# Patient Record
Sex: FEMALE | Race: ASIAN | NOT HISPANIC OR LATINO | Employment: UNEMPLOYED | URBAN - METROPOLITAN AREA
[De-identification: names, ages, dates, MRNs, and addresses within clinical notes are randomized per-mention and may not be internally consistent; named-entity substitution may affect disease eponyms.]

---

## 2021-07-23 ENCOUNTER — OFFICE VISIT (OUTPATIENT)
Dept: FAMILY MEDICINE CLINIC | Facility: CLINIC | Age: 31
End: 2021-07-23

## 2021-07-23 VITALS
DIASTOLIC BLOOD PRESSURE: 76 MMHG | HEART RATE: 83 BPM | HEIGHT: 66 IN | WEIGHT: 160 LBS | OXYGEN SATURATION: 99 % | SYSTOLIC BLOOD PRESSURE: 128 MMHG | BODY MASS INDEX: 25.71 KG/M2 | TEMPERATURE: 97.3 F

## 2021-07-23 DIAGNOSIS — Z13.6 SCREENING FOR CARDIOVASCULAR CONDITION: Primary | ICD-10-CM

## 2021-07-23 DIAGNOSIS — Z00.00 PHYSICAL EXAM: ICD-10-CM

## 2021-07-23 DIAGNOSIS — E55.9 VITAMIN D DEFICIENCY: ICD-10-CM

## 2021-07-23 DIAGNOSIS — Z11.59 NEED FOR HEPATITIS C SCREENING TEST: ICD-10-CM

## 2021-07-23 DIAGNOSIS — R53.83 OTHER FATIGUE: ICD-10-CM

## 2021-07-23 PROCEDURE — 99385 PREV VISIT NEW AGE 18-39: CPT | Performed by: FAMILY MEDICINE

## 2021-07-23 RX ORDER — PRENATAL WITH FERROUS FUM AND FOLIC ACID 3080; 920; 120; 400; 22; 1.84; 3; 20; 10; 1; 12; 200; 27; 25; 2 [IU]/1; [IU]/1; MG/1; [IU]/1; MG/1; MG/1; MG/1; MG/1; MG/1; MG/1; UG/1; MG/1; MG/1; MG/1; MG/1
1 TABLET ORAL DAILY
Qty: 90 TABLET | Refills: 3 | Status: SHIPPED | OUTPATIENT
Start: 2021-07-23 | End: 2022-07-26 | Stop reason: SDUPTHER

## 2021-07-23 NOTE — PROGRESS NOTES
"Chief Complaint  Annual Exam (NP)    Subjective          Es Singer presents to [facility]  History of Present Illness  New pt here for CPE and to get established.  She last had a CPE in [place] 3 years ago.  He has no complaints.  She is fasting.  She does occasional walking.  That is her exercise.  Objective   Vital Signs:   /76   Pulse 83   Temp 97.3 °F (36.3 °C) (Infrared)   Ht 167.6 cm (66\")   Wt 72.6 kg (160 lb)   SpO2 99%   BMI 25.82 kg/m²     Physical Exam  Vitals and nursing note reviewed.   Constitutional:       General: She is not in acute distress.     Appearance: Normal appearance. She is well-developed and normal weight. She is not ill-appearing, toxic-appearing or diaphoretic.   HENT:      Head: Normocephalic and atraumatic.      Right Ear: Tympanic membrane, ear canal and external ear normal. There is no impacted cerumen.      Left Ear: Tympanic membrane, ear canal and external ear normal. There is no impacted cerumen.      Nose: Nose normal. No congestion or rhinorrhea.      Mouth/Throat:      Mouth: Mucous membranes are moist.      Pharynx: Oropharynx is clear. No oropharyngeal exudate or posterior oropharyngeal erythema.   Eyes:      General: No scleral icterus.        Right eye: No discharge.         Left eye: No discharge.      Extraocular Movements: Extraocular movements intact.      Conjunctiva/sclera: Conjunctivae normal.      Pupils: Pupils are equal, round, and reactive to light.   Neck:      Thyroid: No thyroid mass or thyromegaly.      Vascular: No carotid bruit or JVD.      Trachea: Trachea normal. No tracheal tenderness or tracheal deviation.   Cardiovascular:      Rate and Rhythm: Normal rate and regular rhythm.      Heart sounds: Normal heart sounds. No murmur heard.   No gallop.    Pulmonary:      Effort: Pulmonary effort is normal. No respiratory distress.      Breath sounds: Normal breath sounds. No stridor. No wheezing, rhonchi or " rales.   Chest:      Chest wall: No tenderness.   Abdominal:      General: Bowel sounds are normal. There is no distension.      Palpations: Abdomen is soft. There is no mass.      Tenderness: There is no abdominal tenderness. There is no right CVA tenderness, left CVA tenderness, guarding or rebound.      Hernia: No hernia is present.   Musculoskeletal:         General: No swelling or tenderness. Normal range of motion.      Cervical back: Normal range of motion and neck supple. No tenderness.      Right lower leg: No edema.      Left lower leg: No edema.   Lymphadenopathy:      Cervical: No cervical adenopathy.   Skin:     General: Skin is warm and dry.      Coloration: Skin is not jaundiced.   Neurological:      General: No focal deficit present.      Mental Status: She is alert and oriented to person, place, and time.      Sensory: No sensory deficit.      Motor: No weakness or abnormal muscle tone.      Coordination: Coordination normal.      Gait: Gait normal.      Deep Tendon Reflexes: Reflexes normal.   Psychiatric:         Mood and Affect: Mood normal.         Behavior: Behavior normal.         Thought Content: Thought content normal.         Judgment: Judgment normal.        Result Review :{Labs  Result Review  Imaging  Med Tab  Media  Procedures :23}                 Assessment and Plan    Diagnoses and all orders for this visit:    1. Screening for cardiovascular condition (Primary)  -     Comprehensive Metabolic Panel  -     Lipid Panel    2. Need for hepatitis C screening test  -     Hepatitis C Antibody    3. Vitamin D deficiency  -     Vitamin D 25 Hydroxy    4. Physical exam    5. Other fatigue  -     CBC & Differential  -     TSH Rfx On Abnormal To Free T4  -     Vitamin B12        Follow Up   No follow-ups on file.  Patient was given instructions and counseling regarding her condition or for health maintenance advice. Please see specific information pulled into the AVS if appropriate.

## 2021-07-24 DIAGNOSIS — E55.9 VITAMIN D DEFICIENCY: Primary | ICD-10-CM

## 2021-07-24 PROBLEM — E53.8 B12 DEFICIENCY: Status: ACTIVE | Noted: 2021-07-24

## 2021-07-24 LAB
25(OH)D3+25(OH)D2 SERPL-MCNC: 8.3 NG/ML (ref 30–100)
ALBUMIN SERPL-MCNC: 4.6 G/DL (ref 3.5–5.2)
ALBUMIN/GLOB SERPL: 1.6 G/DL
ALP SERPL-CCNC: 74 U/L (ref 39–117)
ALT SERPL-CCNC: 19 U/L (ref 1–33)
AST SERPL-CCNC: 16 U/L (ref 1–32)
BASOPHILS # BLD AUTO: 0.03 10*3/MM3 (ref 0–0.2)
BASOPHILS NFR BLD AUTO: 0.5 % (ref 0–1.5)
BILIRUB SERPL-MCNC: 1 MG/DL (ref 0–1.2)
BUN SERPL-MCNC: 5 MG/DL (ref 6–20)
BUN/CREAT SERPL: 8.6 (ref 7–25)
CALCIUM SERPL-MCNC: 9.7 MG/DL (ref 8.6–10.5)
CHLORIDE SERPL-SCNC: 105 MMOL/L (ref 98–107)
CHOLEST SERPL-MCNC: 175 MG/DL (ref 0–200)
CO2 SERPL-SCNC: 26.3 MMOL/L (ref 22–29)
CREAT SERPL-MCNC: 0.58 MG/DL (ref 0.57–1)
EOSINOPHIL # BLD AUTO: 0.12 10*3/MM3 (ref 0–0.4)
EOSINOPHIL NFR BLD AUTO: 2 % (ref 0.3–6.2)
ERYTHROCYTE [DISTWIDTH] IN BLOOD BY AUTOMATED COUNT: 11.6 % (ref 12.3–15.4)
GLOBULIN SER CALC-MCNC: 2.9 GM/DL
GLUCOSE SERPL-MCNC: 84 MG/DL (ref 65–99)
HCT VFR BLD AUTO: 43.1 % (ref 34–46.6)
HCV AB S/CO SERPL IA: <0.1 S/CO RATIO (ref 0–0.9)
HDLC SERPL-MCNC: 42 MG/DL (ref 40–60)
HGB BLD-MCNC: 14.4 G/DL (ref 12–15.9)
IMM GRANULOCYTES # BLD AUTO: 0.02 10*3/MM3 (ref 0–0.05)
IMM GRANULOCYTES NFR BLD AUTO: 0.3 % (ref 0–0.5)
LDLC SERPL CALC-MCNC: 106 MG/DL (ref 0–100)
LYMPHOCYTES # BLD AUTO: 1.4 10*3/MM3 (ref 0.7–3.1)
LYMPHOCYTES NFR BLD AUTO: 23.3 % (ref 19.6–45.3)
MCH RBC QN AUTO: 30.4 PG (ref 26.6–33)
MCHC RBC AUTO-ENTMCNC: 33.4 G/DL (ref 31.5–35.7)
MCV RBC AUTO: 91.1 FL (ref 79–97)
MONOCYTES # BLD AUTO: 0.39 10*3/MM3 (ref 0.1–0.9)
MONOCYTES NFR BLD AUTO: 6.5 % (ref 5–12)
NEUTROPHILS # BLD AUTO: 4.05 10*3/MM3 (ref 1.7–7)
NEUTROPHILS NFR BLD AUTO: 67.4 % (ref 42.7–76)
NRBC BLD AUTO-RTO: 0 /100 WBC (ref 0–0.2)
PLATELET # BLD AUTO: 288 10*3/MM3 (ref 140–450)
POTASSIUM SERPL-SCNC: 4 MMOL/L (ref 3.5–5.2)
PROT SERPL-MCNC: 7.5 G/DL (ref 6–8.5)
RBC # BLD AUTO: 4.73 10*6/MM3 (ref 3.77–5.28)
SODIUM SERPL-SCNC: 137 MMOL/L (ref 136–145)
TRIGL SERPL-MCNC: 150 MG/DL (ref 0–150)
TSH SERPL DL<=0.005 MIU/L-ACNC: 1.95 UIU/ML (ref 0.27–4.2)
VIT B12 SERPL-MCNC: 173 PG/ML (ref 211–946)
VLDLC SERPL CALC-MCNC: 27 MG/DL (ref 5–40)
WBC # BLD AUTO: 6.01 10*3/MM3 (ref 3.4–10.8)

## 2021-07-24 RX ORDER — ERGOCALCIFEROL 1.25 MG/1
50000 CAPSULE ORAL WEEKLY
Qty: 5 CAPSULE | Refills: 5 | Status: SHIPPED | OUTPATIENT
Start: 2021-07-24 | End: 2022-07-22 | Stop reason: SDUPTHER

## 2021-07-26 ENCOUNTER — TELEPHONE (OUTPATIENT)
Dept: FAMILY MEDICINE CLINIC | Facility: CLINIC | Age: 31
End: 2021-07-26

## 2021-11-24 NOTE — TELEPHONE ENCOUNTER
----- Message from Lesa Guzman MD sent at 7/24/2021  2:44 PM EDT -----  Your B12 levels are low.  I want you to start taking B12 shots.  You will take a shot once a week for a month and then once a month after that.  Also, your Vitamin D is very low.  I am starting you on weekly Vitamin D pills for this.  I have sent it to your pharmacy.  All else looks good.  
Patient informed and verbalized understanding.  Will call back for appointment for injections.   
lmtcb     Okay for hub to read    Your B12 levels are low.  I want you to start taking B12 shots.  You will take a shot once a week for a month and then once a month after that.   Also, your Vitamin D is very low.  I am starting you on weekly Vitamin D pills for this.  I have sent it to your pharmacy.  All else looks good.     
PAST SURGICAL HISTORY:  No significant past surgical history

## 2022-05-11 ENCOUNTER — TELEPHONE (OUTPATIENT)
Dept: FAMILY MEDICINE CLINIC | Facility: CLINIC | Age: 32
End: 2022-05-11

## 2022-05-11 NOTE — TELEPHONE ENCOUNTER
Some labs that were recently submitted were billed wrong, so a form was sent back to the office to correct the issue. When Jet received the form back everything was wrong. Aura was calling just to get the forms fixed and make sure everything was okay for the patient.[phone]

## 2022-07-22 DIAGNOSIS — E55.9 VITAMIN D DEFICIENCY: ICD-10-CM

## 2022-07-25 RX ORDER — ERGOCALCIFEROL 1.25 MG/1
50000 CAPSULE ORAL WEEKLY
Qty: 5 CAPSULE | Refills: 5 | Status: SHIPPED | OUTPATIENT
Start: 2022-07-25 | End: 2023-04-04 | Stop reason: HOSPADM

## 2022-07-25 RX ORDER — ERGOCALCIFEROL 1.25 MG/1
CAPSULE ORAL
Qty: 5 CAPSULE | Refills: 0 | OUTPATIENT
Start: 2022-07-25

## 2022-07-25 NOTE — TELEPHONE ENCOUNTER
Rx Refill Note  Requested Prescriptions     Pending Prescriptions Disp Refills   • vitamin D (ERGOCALCIFEROL) 1.25 MG (82795 UT) capsule capsule [Pharmacy Med Name: Vitamin D (Ergocalciferol) Oral Capsule 1.25 MG (71054 UT)] 5 capsule 0     Sig: TAKE ONE CAPSULE BY MOUTH WEEKLY      Last office visit with prescribing clinician: 7/23/2021      Next office visit with prescribing clinician: 8/4/2022            Mauricio Hutchinson, RAMIREZ/SHANE  07/25/22, 07:13 EDT

## 2022-07-25 NOTE — TELEPHONE ENCOUNTER
Rx Refill Note  Requested Prescriptions     Pending Prescriptions Disp Refills   • vitamin D (ERGOCALCIFEROL) 1.25 MG (80289 UT) capsule capsule 5 capsule 5     Sig: Take 1 capsule by mouth 1 (One) Time Per Week.      Last office visit with prescribing clinician: 7/23/2021      Next office visit with prescribing clinician: 8/4/2022            Mauricio Hutchinson, RAMIREZ/SHANE  07/25/22, 07:11 EDT

## 2022-07-26 DIAGNOSIS — Z00.00 PHYSICAL EXAM: ICD-10-CM

## 2022-07-27 RX ORDER — PRENATAL WITH FERROUS FUM AND FOLIC ACID 3080; 920; 120; 400; 22; 1.84; 3; 20; 10; 1; 12; 200; 27; 25; 2 [IU]/1; [IU]/1; MG/1; [IU]/1; MG/1; MG/1; MG/1; MG/1; MG/1; MG/1; UG/1; MG/1; MG/1; MG/1; MG/1
1 TABLET ORAL DAILY
Qty: 90 TABLET | Refills: 3 | Status: SHIPPED | OUTPATIENT
Start: 2022-07-27 | End: 2022-10-11 | Stop reason: SDUPTHER

## 2022-07-27 NOTE — TELEPHONE ENCOUNTER
Rx Refill Note  Requested Prescriptions     Pending Prescriptions Disp Refills   • Prenatal Vit-Fe Fumarate-FA (Prenatal 27-1) 27-1 MG tablet tablet 90 tablet 3     Sig: Take 1 tablet by mouth Daily.      Last office visit with prescribing clinician: 7/23/2021      Next office visit with prescribing clinician: 8/4/2022            Mauricio Hutchinson CMA/SHANE  07/27/22, 07:28 EDT

## 2022-07-29 DIAGNOSIS — E53.8 B12 DEFICIENCY: ICD-10-CM

## 2022-07-29 DIAGNOSIS — R53.83 OTHER FATIGUE: ICD-10-CM

## 2022-07-29 DIAGNOSIS — Z13.220 ENCOUNTER FOR LIPID SCREENING FOR CARDIOVASCULAR DISEASE: ICD-10-CM

## 2022-07-29 DIAGNOSIS — Z13.6 ENCOUNTER FOR LIPID SCREENING FOR CARDIOVASCULAR DISEASE: ICD-10-CM

## 2022-07-29 DIAGNOSIS — E55.9 VITAMIN D DEFICIENCY: ICD-10-CM

## 2022-07-29 DIAGNOSIS — Z32.01 POSITIVE PREGNANCY TEST: Primary | ICD-10-CM

## 2022-08-02 LAB
25(OH)D3+25(OH)D2 SERPL-MCNC: 31.4 NG/ML (ref 30–100)
ALBUMIN SERPL-MCNC: 4.4 G/DL (ref 3.8–4.8)
ALBUMIN/GLOB SERPL: 1.8 {RATIO} (ref 1.2–2.2)
ALP SERPL-CCNC: 53 IU/L (ref 44–121)
ALT SERPL-CCNC: 19 IU/L (ref 0–32)
AST SERPL-CCNC: 15 IU/L (ref 0–40)
B-HCG SERPL QL: POSITIVE MIU/ML
BASOPHILS # BLD AUTO: 0 X10E3/UL (ref 0–0.2)
BASOPHILS NFR BLD AUTO: 1 %
BILIRUB SERPL-MCNC: 0.8 MG/DL (ref 0–1.2)
BUN SERPL-MCNC: 7 MG/DL (ref 6–20)
BUN/CREAT SERPL: 12 (ref 9–23)
CALCIUM SERPL-MCNC: 9.5 MG/DL (ref 8.7–10.2)
CHLORIDE SERPL-SCNC: 100 MMOL/L (ref 96–106)
CHOLEST SERPL-MCNC: 124 MG/DL (ref 100–199)
CO2 SERPL-SCNC: 20 MMOL/L (ref 20–29)
CREAT SERPL-MCNC: 0.58 MG/DL (ref 0.57–1)
EGFRCR SERPLBLD CKD-EPI 2021: 122 ML/MIN/1.73
EOSINOPHIL # BLD AUTO: 0.1 X10E3/UL (ref 0–0.4)
EOSINOPHIL NFR BLD AUTO: 2 %
ERYTHROCYTE [DISTWIDTH] IN BLOOD BY AUTOMATED COUNT: 11.9 % (ref 11.7–15.4)
GLOBULIN SER CALC-MCNC: 2.5 G/DL (ref 1.5–4.5)
GLUCOSE SERPL-MCNC: 83 MG/DL (ref 65–99)
HCT VFR BLD AUTO: 39.1 % (ref 34–46.6)
HDLC SERPL-MCNC: 45 MG/DL
HGB BLD-MCNC: 13 G/DL (ref 11.1–15.9)
IMM GRANULOCYTES # BLD AUTO: 0 X10E3/UL (ref 0–0.1)
IMM GRANULOCYTES NFR BLD AUTO: 0 %
LDLC SERPL CALC-MCNC: 61 MG/DL (ref 0–99)
LYMPHOCYTES # BLD AUTO: 1.8 X10E3/UL (ref 0.7–3.1)
LYMPHOCYTES NFR BLD AUTO: 24 %
MCH RBC QN AUTO: 29.5 PG (ref 26.6–33)
MCHC RBC AUTO-ENTMCNC: 33.2 G/DL (ref 31.5–35.7)
MCV RBC AUTO: 89 FL (ref 79–97)
MONOCYTES # BLD AUTO: 0.4 X10E3/UL (ref 0.1–0.9)
MONOCYTES NFR BLD AUTO: 6 %
NEUTROPHILS # BLD AUTO: 4.9 X10E3/UL (ref 1.4–7)
NEUTROPHILS NFR BLD AUTO: 67 %
PLATELET # BLD AUTO: 230 X10E3/UL (ref 150–450)
POTASSIUM SERPL-SCNC: 3.8 MMOL/L (ref 3.5–5.2)
PROT SERPL-MCNC: 6.9 G/DL (ref 6–8.5)
RBC # BLD AUTO: 4.41 X10E6/UL (ref 3.77–5.28)
SODIUM SERPL-SCNC: 135 MMOL/L (ref 134–144)
TRIGL SERPL-MCNC: 92 MG/DL (ref 0–149)
TSH SERPL DL<=0.005 MIU/L-ACNC: 3.17 UIU/ML (ref 0.45–4.5)
VIT B12 SERPL-MCNC: 593 PG/ML (ref 232–1245)
VLDLC SERPL CALC-MCNC: 18 MG/DL (ref 5–40)
WBC # BLD AUTO: 7.3 X10E3/UL (ref 3.4–10.8)

## 2022-08-04 ENCOUNTER — OFFICE VISIT (OUTPATIENT)
Dept: FAMILY MEDICINE CLINIC | Facility: CLINIC | Age: 32
End: 2022-08-04

## 2022-08-04 VITALS
RESPIRATION RATE: 16 BRPM | OXYGEN SATURATION: 99 % | HEART RATE: 85 BPM | DIASTOLIC BLOOD PRESSURE: 86 MMHG | HEIGHT: 66 IN | TEMPERATURE: 98 F | BODY MASS INDEX: 23.5 KG/M2 | SYSTOLIC BLOOD PRESSURE: 112 MMHG | WEIGHT: 146.2 LBS

## 2022-08-04 DIAGNOSIS — Z00.00 PHYSICAL EXAM: Primary | ICD-10-CM

## 2022-08-04 DIAGNOSIS — Z34.90 PREGNANCY, UNSPECIFIED GESTATIONAL AGE: ICD-10-CM

## 2022-08-04 PROCEDURE — 99395 PREV VISIT EST AGE 18-39: CPT | Performed by: FAMILY MEDICINE

## 2022-08-04 RX ORDER — LANOLIN ALCOHOL/MO/W.PET/CERES
1000 CREAM (GRAM) TOPICAL DAILY
COMMUNITY
End: 2023-04-04 | Stop reason: HOSPADM

## 2022-08-04 NOTE — PROGRESS NOTES
"Chief Complaint  Annual Exam    Subjective        Es Singer presents to [facility]  History of Present Illness  PT is here for a CPE.  She recently found out she is pregnant and needs a gyn doctor.  Labs done 2 days ago and all was in normal range.    Some walking, no tobacco, no etoh, no drug use.  lmp was June 24th  Objective   Vital Signs:  /86 (BP Location: Left arm, Patient Position: Sitting, Cuff Size: Adult)   Pulse 85   Temp 98 °F (36.7 °C) (Temporal)   Resp 16   Ht 167.6 cm (66\")   Wt 66.3 kg (146 lb 3.2 oz)   SpO2 99%   BMI 23.60 kg/m²   Estimated body mass index is 23.6 kg/m² as calculated from the following:    Height as of this encounter: 167.6 cm (66\").    Weight as of this encounter: 66.3 kg (146 lb 3.2 oz).    BMI is within normal parameters. No other follow-up for BMI required.      Physical Exam  Vitals and nursing note reviewed.   Constitutional:       General: She is not in acute distress.     Appearance: Normal appearance. She is well-developed. She is not ill-appearing, toxic-appearing or diaphoretic.   HENT:      Head: Normocephalic and atraumatic.      Right Ear: Tympanic membrane, ear canal and external ear normal. There is no impacted cerumen.      Left Ear: Tympanic membrane, ear canal and external ear normal. There is no impacted cerumen.      Nose: Nose normal. No congestion or rhinorrhea.      Mouth/Throat:      Mouth: Mucous membranes are moist.      Pharynx: Oropharynx is clear. No oropharyngeal exudate or posterior oropharyngeal erythema.   Eyes:      General: No scleral icterus.        Right eye: No discharge.         Left eye: No discharge.      Extraocular Movements: Extraocular movements intact.      Conjunctiva/sclera: Conjunctivae normal.      Pupils: Pupils are equal, round, and reactive to light.   Neck:      Thyroid: No thyromegaly.      Vascular: No carotid bruit or JVD.      Trachea: No tracheal deviation.   Cardiovascular: "      Rate and Rhythm: Normal rate and regular rhythm.      Heart sounds: Normal heart sounds. No murmur heard.    No friction rub. No gallop.   Pulmonary:      Effort: Pulmonary effort is normal. No respiratory distress.      Breath sounds: Normal breath sounds. No stridor. No wheezing, rhonchi or rales.   Chest:      Chest wall: No tenderness.   Abdominal:      General: Bowel sounds are normal. There is no distension.      Palpations: Abdomen is soft. There is no mass.      Tenderness: There is no abdominal tenderness. There is no right CVA tenderness, left CVA tenderness, guarding or rebound.      Hernia: No hernia is present.   Musculoskeletal:         General: Normal range of motion.      Cervical back: Normal range of motion and neck supple. No rigidity or tenderness.      Right lower leg: No edema.      Left lower leg: No edema.   Lymphadenopathy:      Cervical: No cervical adenopathy.   Skin:     General: Skin is warm and dry.      Coloration: Skin is not jaundiced.   Neurological:      General: No focal deficit present.      Mental Status: She is alert and oriented to person, place, and time. Mental status is at baseline.      Sensory: No sensory deficit.      Motor: No weakness or abnormal muscle tone.      Coordination: Coordination normal.      Gait: Gait normal.      Deep Tendon Reflexes: Reflexes normal.   Psychiatric:         Mood and Affect: Mood normal.         Behavior: Behavior normal.         Thought Content: Thought content normal.         Judgment: Judgment normal.        Result Review :                Assessment and Plan   Diagnoses and all orders for this visit:    1. Physical exam (Primary)    2. Pregnancy, unspecified gestational age  -     Cancel: Ambulatory Referral to Gynecology  -     Ambulatory Referral to Gynecology             Follow Up   No follow-ups on file.  Patient was given instructions and counseling regarding her condition or for health maintenance advice. Please see specific  information pulled into the AVS if appropriate.     CPE done and work on diet and exercise.    Labs reviewed.  Will get gyn referral.  Continue prenatal vitamins and other medications you are currently on.

## 2022-08-17 ENCOUNTER — INITIAL PRENATAL (OUTPATIENT)
Dept: OBSTETRICS AND GYNECOLOGY | Facility: CLINIC | Age: 32
End: 2022-08-17

## 2022-08-17 VITALS — WEIGHT: 148.4 LBS | DIASTOLIC BLOOD PRESSURE: 64 MMHG | BODY MASS INDEX: 23.95 KG/M2 | SYSTOLIC BLOOD PRESSURE: 114 MMHG

## 2022-08-17 DIAGNOSIS — Z34.91 INITIAL OBSTETRIC VISIT IN FIRST TRIMESTER: Primary | ICD-10-CM

## 2022-08-17 LAB
B-HCG UR QL: POSITIVE
EXPIRATION DATE: ABNORMAL
GLUCOSE UR STRIP-MCNC: NEGATIVE MG/DL
INTERNAL NEGATIVE CONTROL: ABNORMAL
INTERNAL POSITIVE CONTROL: ABNORMAL
Lab: ABNORMAL
PROT UR STRIP-MCNC: NEGATIVE MG/DL

## 2022-08-17 PROCEDURE — 81025 URINE PREGNANCY TEST: CPT | Performed by: OBSTETRICS & GYNECOLOGY

## 2022-08-17 PROCEDURE — 0501F PRENATAL FLOW SHEET: CPT | Performed by: OBSTETRICS & GYNECOLOGY

## 2022-08-17 NOTE — PROGRESS NOTES
CC: Initial obstetric visit    HPI: 32-year-old  1 para 0 presents at 7-5/7 weeks for her initial obstetric visit.  Gestation is dated by a certain and regular last menstrual period.  The patient denies abdominal or pelvic pain.  Denies vaginal bleeding.  Denies nausea or vomiting.  Overall, she is feeling well.    Review of systems    This is negative for nausea and vomiting.  Negative for fever or chills.  Negative for vaginal bleeding.  All other systems are reviewed and are negative.    Assessment and plan    1.  Intrauterine pregnancy at 7-5/7 weeks  2.  Pregnancy related counseling was undertaken and questions were answered.  3.  Counseled and questions answered regarding genetic screening.  The patient is considering her options and will decide in the next several weeks.  4.  The patient is unable to tolerate a speculum examination or a bimanual examination.  She has never undergone these examinations before and it is very uncomfortable for the patient.  We discussed other options for confirming gestational age.  I recommend an ultrasound.  The patient does not wish to pursue a vaginal ultrasound.  She has no symptoms of abdominal or pelvic pain.  I recommend an abdominal ultrasound at the next visit.

## 2022-08-19 LAB
BACTERIA UR CULT: NORMAL
BACTERIA UR CULT: NORMAL

## 2022-09-13 ENCOUNTER — ROUTINE PRENATAL (OUTPATIENT)
Dept: OBSTETRICS AND GYNECOLOGY | Facility: CLINIC | Age: 32
End: 2022-09-13

## 2022-09-13 VITALS — WEIGHT: 149.2 LBS | SYSTOLIC BLOOD PRESSURE: 116 MMHG | DIASTOLIC BLOOD PRESSURE: 64 MMHG | BODY MASS INDEX: 24.08 KG/M2

## 2022-09-13 DIAGNOSIS — Z3A.11 11 WEEKS GESTATION OF PREGNANCY: Primary | ICD-10-CM

## 2022-09-13 LAB
GLUCOSE UR STRIP-MCNC: NEGATIVE MG/DL
PROT UR STRIP-MCNC: NEGATIVE MG/DL

## 2022-09-13 PROCEDURE — 0502F SUBSEQUENT PRENATAL CARE: CPT | Performed by: OBSTETRICS & GYNECOLOGY

## 2022-09-13 NOTE — PROGRESS NOTES
CC: Obstetric visit    HPI: 32-year-old  1 para 0 presents at 11-4/7 weeks for her obstetric visit.  She is feeling much better.  Her fatigue has improved considerably.  She has only minimal nausea and vomiting.    Assessment and plan    1.  Intrauterine pregnancy at 11-4/7 weeks.  Ultrasound from today was reviewed and discussed with the patient as well as her .  The estimated gestational age agrees with the patient's last menstrual period.  2.  Prenatal labs today.  3.  The patient has chosen to do free fetal DNA testing and this was performed today as well.  4.  Difficulty tolerating pelvic examination.  The patient would like to discuss this today.  She is concerned about vaginal delivery in this set of circumstances.  We discussed options to address this.  The possibility of pelvic floor muscle physical therapy was discussed.  The patient declines this for now.  She would like to try Kegel's exercises at home and will reassess over time.

## 2022-09-17 LAB
ABO GROUP BLD: NORMAL
BASOPHILS # BLD AUTO: 0 X10E3/UL (ref 0–0.2)
BASOPHILS NFR BLD AUTO: 0 %
BLD GP AB SCN SERPL QL: NEGATIVE
CFDNA.FET/CFDNA.TOTAL SFR FETUS: NORMAL %
CITATION REF LAB TEST: NORMAL
EOSINOPHIL # BLD AUTO: 0.1 X10E3/UL (ref 0–0.4)
EOSINOPHIL NFR BLD AUTO: 1 %
ERYTHROCYTE [DISTWIDTH] IN BLOOD BY AUTOMATED COUNT: 12.1 % (ref 11.7–15.4)
FET 13+18+21+X+Y ANEUP PLAS.CFDNA: NEGATIVE
FET CHR 21 TS PLAS.CFDNA QL: NEGATIVE
FET SEX PLAS.CFDNA DOSAGE CFDNA: NORMAL
FET TS 13 RISK PLAS.CFDNA QL: NEGATIVE
FET TS 18 RISK WBC.DNA+CFDNA QL: NEGATIVE
GA EST FROM CONCEPTION DATE: NORMAL D
GESTATIONAL AGE > 9:: YES
HBV SURFACE AG SERPL QL IA: NEGATIVE
HCT VFR BLD AUTO: 37.6 % (ref 34–46.6)
HCV AB S/CO SERPL IA: <0.1 S/CO RATIO (ref 0–0.9)
HGB BLD-MCNC: 12.5 G/DL (ref 11.1–15.9)
HIV 1+2 AB+HIV1 P24 AG SERPL QL IA: NON REACTIVE
IMM GRANULOCYTES # BLD AUTO: 0.1 X10E3/UL (ref 0–0.1)
IMM GRANULOCYTES NFR BLD AUTO: 1 %
LAB DIRECTOR NAME PROVIDER: NORMAL
LAB DIRECTOR NAME PROVIDER: NORMAL
LABORATORY COMMENT REPORT: NORMAL
LIMITATIONS OF THE TEST: NORMAL
LYMPHOCYTES # BLD AUTO: 1.7 X10E3/UL (ref 0.7–3.1)
LYMPHOCYTES NFR BLD AUTO: 19 %
MCH RBC QN AUTO: 30.5 PG (ref 26.6–33)
MCHC RBC AUTO-ENTMCNC: 33.2 G/DL (ref 31.5–35.7)
MCV RBC AUTO: 92 FL (ref 79–97)
MONOCYTES # BLD AUTO: 0.5 X10E3/UL (ref 0.1–0.9)
MONOCYTES NFR BLD AUTO: 6 %
NEGATIVE PREDICTIVE VALUE: NORMAL
NEUTROPHILS # BLD AUTO: 6.7 X10E3/UL (ref 1.4–7)
NEUTROPHILS NFR BLD AUTO: 73 %
NOTE: NORMAL
PERFORMANCE CHARACTERISTICS: NORMAL
PLATELET # BLD AUTO: 234 X10E3/UL (ref 150–450)
POSITIVE PREDICTIVE VALUE: NORMAL
RBC # BLD AUTO: 4.1 X10E6/UL (ref 3.77–5.28)
REF LAB TEST METHOD: NORMAL
RH BLD: POSITIVE
RPR SER QL: NON REACTIVE
RUBV IGG SERPL IA-ACNC: 3.59 INDEX
TEST PERFORMANCE INFO SPEC: NORMAL
WBC # BLD AUTO: 9.2 X10E3/UL (ref 3.4–10.8)

## 2022-10-11 ENCOUNTER — ROUTINE PRENATAL (OUTPATIENT)
Dept: OBSTETRICS AND GYNECOLOGY | Facility: CLINIC | Age: 32
End: 2022-10-11

## 2022-10-11 VITALS — WEIGHT: 155.8 LBS | SYSTOLIC BLOOD PRESSURE: 112 MMHG | DIASTOLIC BLOOD PRESSURE: 62 MMHG | BODY MASS INDEX: 25.15 KG/M2

## 2022-10-11 DIAGNOSIS — Z00.00 PHYSICAL EXAM: ICD-10-CM

## 2022-10-11 DIAGNOSIS — Z34.92 SECOND TRIMESTER PREGNANCY: Primary | ICD-10-CM

## 2022-10-11 LAB
GLUCOSE UR STRIP-MCNC: NEGATIVE MG/DL
PROT UR STRIP-MCNC: NEGATIVE MG/DL

## 2022-10-11 PROCEDURE — 90686 IIV4 VACC NO PRSV 0.5 ML IM: CPT | Performed by: OBSTETRICS & GYNECOLOGY

## 2022-10-11 PROCEDURE — 0502F SUBSEQUENT PRENATAL CARE: CPT | Performed by: OBSTETRICS & GYNECOLOGY

## 2022-10-11 PROCEDURE — 90471 IMMUNIZATION ADMIN: CPT | Performed by: OBSTETRICS & GYNECOLOGY

## 2022-10-11 RX ORDER — PRENATAL WITH FERROUS FUM AND FOLIC ACID 3080; 920; 120; 400; 22; 1.84; 3; 20; 10; 1; 12; 200; 27; 25; 2 [IU]/1; [IU]/1; MG/1; [IU]/1; MG/1; MG/1; MG/1; MG/1; MG/1; MG/1; UG/1; MG/1; MG/1; MG/1; MG/1
1 TABLET ORAL DAILY
Qty: 90 TABLET | Refills: 3 | Status: SHIPPED | OUTPATIENT
Start: 2022-10-11 | End: 2023-01-13 | Stop reason: SDUPTHER

## 2022-10-11 NOTE — PROGRESS NOTES
CC: Obstetric visit    HPI: 32-year-old  1 para 0 presents at 15-4/7 weeks for her obstetric visit.  She is feeling well.  No complaints today.    Assessment and plan    1.  Intrauterine pregnancy at 15-4/7 weeks  2.  Free fetal DNA screening is negative.  Counseled regarding AFP screening.  The patient would like to proceed.  3.  Screening ultrasound at the next visit.  4.  Counseled regarding ACOG recommendations for the influenza vaccine in pregnancy.  The patient would like to proceed.

## 2022-10-13 LAB
AFP INTERP SERPL-IMP: NORMAL
AFP INTERP SERPL-IMP: NORMAL
AFP MOM SERPL: 1.18
AFP SERPL-MCNC: 38.2 NG/ML
AGE AT DELIVERY: 34.2 YR
GA METHOD: NORMAL
GA: 15.6 WEEKS
IDDM PATIENT QL: NO
LABORATORY COMMENT REPORT: NORMAL
MULTIPLE PREGNANCY: NO
NEURAL TUBE DEFECT RISK FETUS: 6916 %
RESULT: NORMAL

## 2022-11-15 ENCOUNTER — ROUTINE PRENATAL (OUTPATIENT)
Dept: OBSTETRICS AND GYNECOLOGY | Facility: CLINIC | Age: 32
End: 2022-11-15

## 2022-11-15 VITALS — WEIGHT: 161.4 LBS | DIASTOLIC BLOOD PRESSURE: 64 MMHG | BODY MASS INDEX: 26.05 KG/M2 | SYSTOLIC BLOOD PRESSURE: 116 MMHG

## 2022-11-15 DIAGNOSIS — Z34.92 SECOND TRIMESTER PREGNANCY: Primary | ICD-10-CM

## 2022-11-15 LAB
GLUCOSE UR STRIP-MCNC: NEGATIVE MG/DL
PROT UR STRIP-MCNC: NEGATIVE MG/DL

## 2022-11-15 PROCEDURE — 0502F SUBSEQUENT PRENATAL CARE: CPT | Performed by: OBSTETRICS & GYNECOLOGY

## 2022-11-15 NOTE — PROGRESS NOTES
CC: Obstetric visit    HPI: 32-year-old  1 para 0 presents at 20-4/7 weeks for her obstetric visit.  She has begun to appreciate fetal movement.  She has no complaints today.    Assessment and plan    1.  Intrauterine pregnancy at 20-4/7 weeks  2.  Screening ultrasound was reviewed and discussed with the patient.  This is a normal screening ultrasound.  3.  1 hour glucose tolerance test at the next visit.  Counseled.  4.  The patient plans to take a road trip in the next few weeks.  We discussed strategies to minimize the risk of deep vein thrombosis while traveling.

## 2022-12-13 ENCOUNTER — ROUTINE PRENATAL (OUTPATIENT)
Dept: OBSTETRICS AND GYNECOLOGY | Facility: CLINIC | Age: 32
End: 2022-12-13

## 2022-12-13 VITALS — WEIGHT: 168.4 LBS | DIASTOLIC BLOOD PRESSURE: 64 MMHG | BODY MASS INDEX: 27.18 KG/M2 | SYSTOLIC BLOOD PRESSURE: 116 MMHG

## 2022-12-13 DIAGNOSIS — Z3A.24 24 WEEKS GESTATION OF PREGNANCY: Primary | ICD-10-CM

## 2022-12-13 LAB
GLUCOSE UR STRIP-MCNC: NEGATIVE MG/DL
PROT UR STRIP-MCNC: NEGATIVE MG/DL

## 2022-12-13 PROCEDURE — 0502F SUBSEQUENT PRENATAL CARE: CPT | Performed by: OBSTETRICS & GYNECOLOGY

## 2022-12-13 NOTE — PROGRESS NOTES
CC: Obstetric visit    HPI: 32-year-old  1 para 0 presents at 24-4/7 weeks for an obstetric visit.  Her baby is moving actively.  She has occasional left or right lower quadrant pains when changing from the left lateral decubitus to right lateral decubitus position in bed at night.  Does not have any other painful episodes.    Physical examination    The abdomen is soft and gravid.  There is no epigastric tenderness.  No fundal tenderness.  No CVA tenderness.  No suprapubic tenderness.  The patient declines pelvic examination  Extremities are equal in size    Assessment and plan    1.  Intrauterine pregnancy at 24-4/7 weeks  2.  The patient had her influenza vaccine in October.  3.  1 hour glucose tolerance test today.  4.  Counseled regarding intermittent left and right lower quadrant pain with position changes at night.  By history and physical examination, this is most consistent with round ligament pain.  We discussed the pathophysiology of this and I answered the patient's questions.  I offered to perform a pelvic examination to rule out hernia or other causes of the patient's pain.  The patient declines this.

## 2022-12-16 ENCOUNTER — TELEPHONE (OUTPATIENT)
Dept: OBSTETRICS AND GYNECOLOGY | Facility: CLINIC | Age: 32
End: 2022-12-16

## 2022-12-19 ENCOUNTER — TELEPHONE (OUTPATIENT)
Dept: OBSTETRICS AND GYNECOLOGY | Facility: CLINIC | Age: 32
End: 2022-12-19

## 2022-12-19 NOTE — TELEPHONE ENCOUNTER
Patient is calling regarding gestational diabetes and CBC lab results. Pt would like to discuss provider suggestions if levels are within normal range.     Please advise,  Thank you

## 2022-12-20 RX ORDER — FERROUS SULFATE 325(65) MG
325 TABLET ORAL 2 TIMES DAILY WITH MEALS
Qty: 60 TABLET | Refills: 11 | Status: SHIPPED | OUTPATIENT
Start: 2022-12-20 | End: 2023-02-19 | Stop reason: SDUPTHER

## 2022-12-20 NOTE — TELEPHONE ENCOUNTER
Please contact the patient and let her know that she has passed her 1 hour glucose tolerance test.  She is, however, anemic.  A prescription for iron sulfate has been sent to her pharmacy.  I advise her to take it twice daily with meals.  Thank you

## 2022-12-29 ENCOUNTER — TELEPHONE (OUTPATIENT)
Dept: OBSTETRICS AND GYNECOLOGY | Facility: CLINIC | Age: 32
End: 2022-12-29
Payer: COMMERCIAL

## 2022-12-29 NOTE — TELEPHONE ENCOUNTER
Pt is having dental work in the first or second week of January and just wants to make sure that is okay in her third trimester with the same restrictions as before.    Please advise.    Thanks,  Saw

## 2023-01-10 ENCOUNTER — ROUTINE PRENATAL (OUTPATIENT)
Dept: OBSTETRICS AND GYNECOLOGY | Facility: CLINIC | Age: 33
End: 2023-01-10
Payer: COMMERCIAL

## 2023-01-10 VITALS — WEIGHT: 170.4 LBS | DIASTOLIC BLOOD PRESSURE: 62 MMHG | SYSTOLIC BLOOD PRESSURE: 114 MMHG | BODY MASS INDEX: 27.5 KG/M2

## 2023-01-10 DIAGNOSIS — D50.9 IRON DEFICIENCY ANEMIA DURING PREGNANCY: ICD-10-CM

## 2023-01-10 DIAGNOSIS — Z3A.28 28 WEEKS GESTATION OF PREGNANCY: Primary | ICD-10-CM

## 2023-01-10 DIAGNOSIS — O99.019 IRON DEFICIENCY ANEMIA DURING PREGNANCY: ICD-10-CM

## 2023-01-10 PROCEDURE — 0502F SUBSEQUENT PRENATAL CARE: CPT | Performed by: OBSTETRICS & GYNECOLOGY

## 2023-01-10 NOTE — PROGRESS NOTES
CC: Obstetric visit    HPI: 32-year-old  1 para 0 presents at 28-4/7 weeks for her obstetric visit.  Her baby is moving actively.  She passed her 1 hour glucose tolerance test, but was noted to be anemic at that time.  She is asymptomatic from this.  She has been taking iron pills twice daily with meals for approximately 1 month.    Assessment and plan    1.  Intrauterine pregnancy at 28-4/7 weeks  2.  Anemia.  Counseled.  The patient has been taking iron sulfate twice daily.  We will check a hemoglobin today to assess for improvement.

## 2023-01-11 LAB
HCT VFR BLD AUTO: 33.9 % (ref 34–46.6)
HGB BLD-MCNC: 11.4 G/DL (ref 12–15.9)

## 2023-01-12 ENCOUNTER — TELEPHONE (OUTPATIENT)
Dept: OBSTETRICS AND GYNECOLOGY | Facility: CLINIC | Age: 33
End: 2023-01-12
Payer: COMMERCIAL

## 2023-01-12 NOTE — TELEPHONE ENCOUNTER
Patient states she has dentist appt today. She was provided with dental work note from our office, but would like to know if there is a limit on how many xrays she can receive while pregnant at the dentist.     Please advise,   Thank you

## 2023-01-12 NOTE — TELEPHONE ENCOUNTER
I would recommend that the patient let the dentist know that she is pregnant.  They will provide a lead shield for the abdomen.  Also, they may choose not to perform x-rays at all until the pregnancy has ended.  Thank you.

## 2023-01-13 DIAGNOSIS — Z00.00 PHYSICAL EXAM: ICD-10-CM

## 2023-01-13 RX ORDER — PRENATAL WITH FERROUS FUM AND FOLIC ACID 3080; 920; 120; 400; 22; 1.84; 3; 20; 10; 1; 12; 200; 27; 25; 2 [IU]/1; [IU]/1; MG/1; [IU]/1; MG/1; MG/1; MG/1; MG/1; MG/1; MG/1; UG/1; MG/1; MG/1; MG/1; MG/1
1 TABLET ORAL DAILY
Qty: 90 TABLET | Refills: 3 | Status: SHIPPED | OUTPATIENT
Start: 2023-01-13

## 2023-01-13 NOTE — TELEPHONE ENCOUNTER
Rx Refill Note  Requested Prescriptions     Pending Prescriptions Disp Refills   • Prenatal Vit-Fe Fumarate-FA (Prenatal 27-1) 27-1 MG tablet tablet 90 tablet 3     Sig: Take 1 tablet by mouth Daily.      Last office visit with prescribing clinician: 8/4/2022  Last telemedicine visit with prescribing clinician: Visit date not found   Next office visit with prescribing clinician: Visit date not found

## 2023-02-07 ENCOUNTER — ROUTINE PRENATAL (OUTPATIENT)
Dept: OBSTETRICS AND GYNECOLOGY | Facility: CLINIC | Age: 33
End: 2023-02-07
Payer: COMMERCIAL

## 2023-02-07 VITALS — DIASTOLIC BLOOD PRESSURE: 62 MMHG | SYSTOLIC BLOOD PRESSURE: 116 MMHG | BODY MASS INDEX: 28.18 KG/M2 | WEIGHT: 174.6 LBS

## 2023-02-07 DIAGNOSIS — Z3A.32 32 WEEKS GESTATION OF PREGNANCY: Primary | ICD-10-CM

## 2023-02-07 LAB
GLUCOSE UR STRIP-MCNC: NEGATIVE MG/DL
PROT UR STRIP-MCNC: NEGATIVE MG/DL

## 2023-02-07 PROCEDURE — 0502F SUBSEQUENT PRENATAL CARE: CPT | Performed by: OBSTETRICS & GYNECOLOGY

## 2023-02-07 NOTE — PROGRESS NOTES
CC: Obstetric visit    HPI: 32-year-old  1 para 0 presents at 32-4/7 weeks for an obstetric visit.  Her baby is moving actively.  She has no complaints today.    Assessment and plan    1.  Intrauterine pregnancy at 32-4/7 weeks  2.  Fundal height greater than dates.  We will check an ultrasound at the next visit for estimated fetal weight.  3.  The patient's father-in-law and mother-in-law will be coming from [place] at the end of the pregnancy to help care for the patient and her .  She will need a letter for them to present at customs and immigration.

## 2023-02-09 ENCOUNTER — TELEPHONE (OUTPATIENT)
Dept: OBSTETRICS AND GYNECOLOGY | Facility: CLINIC | Age: 33
End: 2023-02-09

## 2023-02-15 ENCOUNTER — TELEPHONE (OUTPATIENT)
Dept: OBSTETRICS AND GYNECOLOGY | Facility: CLINIC | Age: 33
End: 2023-02-15
Payer: COMMERCIAL

## 2023-02-20 RX ORDER — FERROUS SULFATE 325(65) MG
325 TABLET ORAL 2 TIMES DAILY WITH MEALS
Qty: 60 TABLET | Refills: 11 | Status: SHIPPED | OUTPATIENT
Start: 2023-02-20

## 2023-02-21 ENCOUNTER — ROUTINE PRENATAL (OUTPATIENT)
Dept: OBSTETRICS AND GYNECOLOGY | Facility: CLINIC | Age: 33
End: 2023-02-21
Payer: COMMERCIAL

## 2023-02-21 VITALS — DIASTOLIC BLOOD PRESSURE: 70 MMHG | WEIGHT: 178.8 LBS | BODY MASS INDEX: 28.86 KG/M2 | SYSTOLIC BLOOD PRESSURE: 118 MMHG

## 2023-02-21 DIAGNOSIS — Z3A.34 34 WEEKS GESTATION OF PREGNANCY: Primary | ICD-10-CM

## 2023-02-21 LAB
GLUCOSE UR STRIP-MCNC: NEGATIVE MG/DL
PROT UR STRIP-MCNC: NEGATIVE MG/DL

## 2023-02-21 PROCEDURE — 0502F SUBSEQUENT PRENATAL CARE: CPT | Performed by: OBSTETRICS & GYNECOLOGY

## 2023-02-21 NOTE — PROGRESS NOTES
CC: Obstetric visit    HPI: 32-year-old  1 para 0 presents at 34-4/7 weeks for her obstetric visit.  Her baby is moving actively.  She has no complaints today.    Assessment and plan    1.  Intrauterine pregnancy at 34-4/7 weeks  2.  Ultrasound was reviewed and discussed with the patient.  Estimated fetal weight is in the 64th percentile.  Abdominal circumference is in the 88th.  I counseled the patient and answered her questions.  3.  Group B strep cultures at the next visit.  Counseled.

## 2023-03-08 ENCOUNTER — ROUTINE PRENATAL (OUTPATIENT)
Dept: OBSTETRICS AND GYNECOLOGY | Facility: CLINIC | Age: 33
End: 2023-03-08
Payer: COMMERCIAL

## 2023-03-08 VITALS — SYSTOLIC BLOOD PRESSURE: 122 MMHG | DIASTOLIC BLOOD PRESSURE: 68 MMHG | BODY MASS INDEX: 29.21 KG/M2 | WEIGHT: 181 LBS

## 2023-03-08 DIAGNOSIS — Z3A.36 36 WEEKS GESTATION OF PREGNANCY: Primary | ICD-10-CM

## 2023-03-08 PROCEDURE — 0502F SUBSEQUENT PRENATAL CARE: CPT | Performed by: OBSTETRICS & GYNECOLOGY

## 2023-03-08 NOTE — PROGRESS NOTES
CC: Obstetric visit    HPI: 32-year-old  1 para 0 presents at 36-5/7 weeks for her obstetric visit.  Her baby is moving actively.  She has no contractions.    Assessment and plan    1.  Intrauterine pregnancy at 36-5/7 weeks  2.  Group B strep cultures performed today.  Counseled.  3.  Abdominal circumference is in the 88th percentile at the last ultrasound.  Pelvis is average.  I counseled the patient and answered her questions.  She is concerned about the size of the baby.  I recommend a repeat ultrasound next week for interval growth.  After this, we can discuss timing and route of delivery.  We reviewed options which would include expectant management versus induction of labor at 39 weeks versus primary  delivery if estimated fetal weight is large.  The benefits and risks of each approach were discussed and the patient's questions were answered.  We will discuss this further after the next ultrasound and the patient will come to a decision.

## 2023-03-12 LAB
B-HEM STREP SPEC QL CULT: NEGATIVE
Lab: NORMAL

## 2023-03-14 ENCOUNTER — ROUTINE PRENATAL (OUTPATIENT)
Dept: OBSTETRICS AND GYNECOLOGY | Facility: CLINIC | Age: 33
End: 2023-03-14
Payer: COMMERCIAL

## 2023-03-14 VITALS — SYSTOLIC BLOOD PRESSURE: 116 MMHG | WEIGHT: 180.4 LBS | BODY MASS INDEX: 29.12 KG/M2 | DIASTOLIC BLOOD PRESSURE: 64 MMHG

## 2023-03-14 DIAGNOSIS — Z3A.37 37 WEEKS GESTATION OF PREGNANCY: Primary | ICD-10-CM

## 2023-03-14 LAB
GLUCOSE UR STRIP-MCNC: NEGATIVE MG/DL
PROT UR STRIP-MCNC: NEGATIVE MG/DL

## 2023-03-14 PROCEDURE — 0502F SUBSEQUENT PRENATAL CARE: CPT | Performed by: OBSTETRICS & GYNECOLOGY

## 2023-03-14 NOTE — PROGRESS NOTES
CC: Obstetric visit    HPI: 32-year-old  1 para 0 presents at 37-4/7 weeks for her obstetric visit.  Her baby is moving actively.  She has only rare contractions.  Ultrasound was repeated today.  This shows an estimated fetal weight of 7 pounds 11 ounces (75th percentile).  Abdominal circumference is in the 84th percentile.    Assessment and plan    1.  Intrauterine pregnancy at 37-4/7 weeks  2.  Group B strep cultures are negative.  3.  Ultrasound was reviewed and discussed with the patient as well as her .  Estimated fetal weight is in the 75th percentile and abdominal circumference is in the 84th percentile.  This compares with an abdominal circumference in the 88th percentile at the last ultrasound.  We discussed in detail the benefits and risks of expectant management versus induction of labor at 39 weeks versus primary  delivery.  I answered the patient's questions.  30 minutes were spent in direct face-to-face counseling on this issue.  For now, the patient would like to manage expectantly.

## 2023-03-23 ENCOUNTER — ROUTINE PRENATAL (OUTPATIENT)
Dept: OBSTETRICS AND GYNECOLOGY | Facility: CLINIC | Age: 33
End: 2023-03-23
Payer: COMMERCIAL

## 2023-03-23 VITALS — DIASTOLIC BLOOD PRESSURE: 62 MMHG | BODY MASS INDEX: 29.15 KG/M2 | WEIGHT: 180.6 LBS | SYSTOLIC BLOOD PRESSURE: 114 MMHG

## 2023-03-23 DIAGNOSIS — Z3A.38 38 WEEKS GESTATION OF PREGNANCY: Primary | ICD-10-CM

## 2023-03-23 NOTE — PROGRESS NOTES
CC: Obstetric visit    HPI: 32-year-old  1 para 0 presents at 38-6/7 weeks for obstetric visit.  Her baby is moving actively.  She has no contractions.  Ultrasound was performed today.  Estimated fetal weight in the 61st percentile (7 pounds 11 ounces).  Abdominal circumference is at the 47th percentile.    Assessment and plan    1.  Intrauterine pregnancy at 38-6/7 weeks  2.  Counseled regarding the remainder of the pregnancy.  Estimated fetal weight is in the 65th percentile cervix is unfavorable.  The benefits and risks of an induction of labor at 39 weeks versus continued expectant management were discussed with the patient and her .  I answered their questions.  They prefer expectant management and I feel that this is appropriate.  3.  Warnings and rupture membrane warnings given

## 2023-03-31 ENCOUNTER — ROUTINE PRENATAL (OUTPATIENT)
Dept: OBSTETRICS AND GYNECOLOGY | Facility: CLINIC | Age: 33
End: 2023-03-31
Payer: COMMERCIAL

## 2023-03-31 VITALS — BODY MASS INDEX: 29.31 KG/M2 | WEIGHT: 181.6 LBS | DIASTOLIC BLOOD PRESSURE: 84 MMHG | SYSTOLIC BLOOD PRESSURE: 120 MMHG

## 2023-03-31 DIAGNOSIS — Z3A.40 40 WEEKS GESTATION OF PREGNANCY: Primary | ICD-10-CM

## 2023-03-31 LAB
GLUCOSE UR STRIP-MCNC: NEGATIVE MG/DL
PROT UR STRIP-MCNC: NEGATIVE MG/DL

## 2023-03-31 NOTE — PROGRESS NOTES
CC: Obstetric visit    HPI: 32-year-old  1 para 0 presents at 40-0/7 weeks for her obstetric visit.  Her baby is moving actively.  She has increasing pelvic pressure, but only rare contractions.    Assessment and plan    1.  Intrauterine pregnancy at 40-0/7 weeks.  Counseled regarding the remainder of the pregnancy.  We discussed the benefits and risks of an induction of labor versus continued expectant management.  For now, the patient would like to continue to manage expectantly.  We discussed signs and symptoms of labor and reasons for the patient to call or come to the hospital.  If the patient is undelivered at 41 weeks, induction should be considered by 41-1/2 weeks.  I answered the patient's questions and she agrees with these recommendations.  If the patient is undelivered at the next visit, a biophysical profile with DESTINI will be performed at that time.

## 2023-04-02 ENCOUNTER — HOSPITAL ENCOUNTER (INPATIENT)
Facility: HOSPITAL | Age: 33
LOS: 1 days | Discharge: HOME | End: 2023-04-04
Attending: OBSTETRICS & GYNECOLOGY | Admitting: OBSTETRICS & GYNECOLOGY
Payer: COMMERCIAL

## 2023-04-02 PROCEDURE — 99202 OFFICE O/P NEW SF 15 MIN: CPT | Performed by: OBSTETRICS & GYNECOLOGY

## 2023-04-03 ENCOUNTER — ANESTHESIA (OUTPATIENT)
Dept: LABOR AND DELIVERY | Facility: HOSPITAL | Age: 33
End: 2023-04-03
Payer: COMMERCIAL

## 2023-04-03 ENCOUNTER — ANESTHESIA EVENT (OUTPATIENT)
Dept: LABOR AND DELIVERY | Facility: HOSPITAL | Age: 33
End: 2023-04-03
Payer: COMMERCIAL

## 2023-04-03 PROBLEM — Z34.90 PREGNANCY: Status: ACTIVE | Noted: 2023-04-03

## 2023-04-03 LAB
ABO GROUP BLD: NORMAL
ALBUMIN SERPL-MCNC: 3.6 G/DL (ref 3.5–5.2)
ALBUMIN/GLOB SERPL: 1.3 G/DL
ALP SERPL-CCNC: 247 U/L (ref 39–117)
ALT SERPL W P-5'-P-CCNC: 15 U/L (ref 1–33)
ANION GAP SERPL CALCULATED.3IONS-SCNC: 12.3 MMOL/L (ref 5–15)
AST SERPL-CCNC: 18 U/L (ref 1–32)
BASOPHILS # BLD AUTO: 0.01 10*3/MM3 (ref 0–0.2)
BASOPHILS NFR BLD AUTO: 0.1 % (ref 0–1.5)
BILIRUB SERPL-MCNC: 0.3 MG/DL (ref 0–1.2)
BLD GP AB SCN SERPL QL: NEGATIVE
BUN SERPL-MCNC: 5 MG/DL (ref 6–20)
BUN/CREAT SERPL: 9.3 (ref 7–25)
CALCIUM SPEC-SCNC: 8.4 MG/DL (ref 8.6–10.5)
CHLORIDE SERPL-SCNC: 105 MMOL/L (ref 98–107)
CO2 SERPL-SCNC: 19.7 MMOL/L (ref 22–29)
CREAT SERPL-MCNC: 0.54 MG/DL (ref 0.57–1)
DEPRECATED RDW RBC AUTO: 43.7 FL (ref 37–54)
EGFRCR SERPLBLD CKD-EPI 2021: 124.1 ML/MIN/1.73
EOSINOPHIL # BLD AUTO: 0.07 10*3/MM3 (ref 0–0.4)
EOSINOPHIL NFR BLD AUTO: 1 % (ref 0.3–6.2)
ERYTHROCYTE [DISTWIDTH] IN BLOOD BY AUTOMATED COUNT: 12.5 % (ref 12.3–15.4)
GLOBULIN UR ELPH-MCNC: 2.7 GM/DL
GLUCOSE SERPL-MCNC: 140 MG/DL (ref 65–99)
HCT VFR BLD AUTO: 37.8 % (ref 34–46.6)
HGB BLD-MCNC: 12.5 G/DL (ref 12–15.9)
IMM GRANULOCYTES # BLD AUTO: 0.05 10*3/MM3 (ref 0–0.05)
IMM GRANULOCYTES NFR BLD AUTO: 0.7 % (ref 0–0.5)
LYMPHOCYTES # BLD AUTO: 1.44 10*3/MM3 (ref 0.7–3.1)
LYMPHOCYTES NFR BLD AUTO: 20.8 % (ref 19.6–45.3)
MCH RBC QN AUTO: 30.9 PG (ref 26.6–33)
MCHC RBC AUTO-ENTMCNC: 33.1 G/DL (ref 31.5–35.7)
MCV RBC AUTO: 93.3 FL (ref 79–97)
MONOCYTES # BLD AUTO: 0.41 10*3/MM3 (ref 0.1–0.9)
MONOCYTES NFR BLD AUTO: 5.9 % (ref 5–12)
NEUTROPHILS NFR BLD AUTO: 4.95 10*3/MM3 (ref 1.7–7)
NEUTROPHILS NFR BLD AUTO: 71.5 % (ref 42.7–76)
NRBC BLD AUTO-RTO: 0 /100 WBC (ref 0–0.2)
PLATELET # BLD AUTO: 180 10*3/MM3 (ref 140–450)
PMV BLD AUTO: 11.4 FL (ref 6–12)
POTASSIUM SERPL-SCNC: 3.4 MMOL/L (ref 3.5–5.2)
PROT SERPL-MCNC: 6.3 G/DL (ref 6–8.5)
RBC # BLD AUTO: 4.05 10*6/MM3 (ref 3.77–5.28)
RH BLD: POSITIVE
SODIUM SERPL-SCNC: 137 MMOL/L (ref 136–145)
T&S EXPIRATION DATE: NORMAL
WBC NRBC COR # BLD: 6.93 10*3/MM3 (ref 3.4–10.8)

## 2023-04-03 PROCEDURE — 0DQR0ZZ REPAIR ANAL SPHINCTER, OPEN APPROACH: ICD-10-PCS | Performed by: OBSTETRICS & GYNECOLOGY

## 2023-04-03 PROCEDURE — 80053 COMPREHEN METABOLIC PANEL: CPT | Performed by: OBSTETRICS & GYNECOLOGY

## 2023-04-03 PROCEDURE — 86850 RBC ANTIBODY SCREEN: CPT | Performed by: OBSTETRICS & GYNECOLOGY

## 2023-04-03 PROCEDURE — 86900 BLOOD TYPING SEROLOGIC ABO: CPT | Performed by: OBSTETRICS & GYNECOLOGY

## 2023-04-03 PROCEDURE — 85025 COMPLETE CBC W/AUTO DIFF WBC: CPT | Performed by: OBSTETRICS & GYNECOLOGY

## 2023-04-03 PROCEDURE — 86901 BLOOD TYPING SEROLOGIC RH(D): CPT | Performed by: OBSTETRICS & GYNECOLOGY

## 2023-04-03 RX ORDER — BISACODYL 10 MG
10 SUPPOSITORY, RECTAL RECTAL DAILY PRN
Status: DISCONTINUED | OUTPATIENT
Start: 2023-04-04 | End: 2023-04-04 | Stop reason: HOSPADM

## 2023-04-03 RX ORDER — SODIUM CHLORIDE 0.9 % (FLUSH) 0.9 %
10 SYRINGE (ML) INJECTION AS NEEDED
Status: DISCONTINUED | OUTPATIENT
Start: 2023-04-03 | End: 2023-04-03 | Stop reason: HOSPADM

## 2023-04-03 RX ORDER — FENTANYL CIT 0.2 MG/100ML-ROPIV 0.2%-NACL 0.9% EPIDURAL INJ 2/0.2 MCG/ML-%
10 SOLUTION INJECTION CONTINUOUS
Status: DISCONTINUED | OUTPATIENT
Start: 2023-04-03 | End: 2023-04-04 | Stop reason: HOSPADM

## 2023-04-03 RX ORDER — IBUPROFEN 600 MG/1
600 TABLET ORAL EVERY 6 HOURS PRN
Status: DISCONTINUED | OUTPATIENT
Start: 2023-04-03 | End: 2023-04-04 | Stop reason: HOSPADM

## 2023-04-03 RX ORDER — FERROUS SULFATE 325(65) MG
325 TABLET ORAL 2 TIMES DAILY WITH MEALS
Status: DISCONTINUED | OUTPATIENT
Start: 2023-04-03 | End: 2023-04-04 | Stop reason: HOSPADM

## 2023-04-03 RX ORDER — OXYTOCIN/0.9 % SODIUM CHLORIDE 30/500 ML
250 PLASTIC BAG, INJECTION (ML) INTRAVENOUS CONTINUOUS
Status: ACTIVE | OUTPATIENT
Start: 2023-04-03 | End: 2023-04-03

## 2023-04-03 RX ORDER — SODIUM CHLORIDE 0.9 % (FLUSH) 0.9 %
10 SYRINGE (ML) INJECTION EVERY 12 HOURS SCHEDULED
Status: DISCONTINUED | OUTPATIENT
Start: 2023-04-03 | End: 2023-04-03 | Stop reason: HOSPADM

## 2023-04-03 RX ORDER — ONDANSETRON 2 MG/ML
4 INJECTION INTRAMUSCULAR; INTRAVENOUS EVERY 6 HOURS PRN
Status: DISCONTINUED | OUTPATIENT
Start: 2023-04-03 | End: 2023-04-04 | Stop reason: HOSPADM

## 2023-04-03 RX ORDER — LIDOCAINE HYDROCHLORIDE 10 MG/ML
5 INJECTION, SOLUTION EPIDURAL; INFILTRATION; INTRACAUDAL; PERINEURAL AS NEEDED
Status: DISCONTINUED | OUTPATIENT
Start: 2023-04-03 | End: 2023-04-03 | Stop reason: HOSPADM

## 2023-04-03 RX ORDER — TRANEXAMIC ACID 10 MG/ML
1000 INJECTION, SOLUTION INTRAVENOUS ONCE AS NEEDED
Status: DISCONTINUED | OUTPATIENT
Start: 2023-04-03 | End: 2023-04-04 | Stop reason: HOSPADM

## 2023-04-03 RX ORDER — LIDOCAINE HYDROCHLORIDE 10 MG/ML
30 INJECTION, SOLUTION INFILTRATION; PERINEURAL AS NEEDED
Status: DISCONTINUED | OUTPATIENT
Start: 2023-04-03 | End: 2023-04-04 | Stop reason: HOSPADM

## 2023-04-03 RX ORDER — HYDROCORTISONE 25 MG/G
1 CREAM TOPICAL AS NEEDED
Status: DISCONTINUED | OUTPATIENT
Start: 2023-04-03 | End: 2023-04-04 | Stop reason: HOSPADM

## 2023-04-03 RX ORDER — ONDANSETRON 2 MG/ML
4 INJECTION INTRAMUSCULAR; INTRAVENOUS ONCE AS NEEDED
Status: DISCONTINUED | OUTPATIENT
Start: 2023-04-03 | End: 2023-04-03 | Stop reason: HOSPADM

## 2023-04-03 RX ORDER — EPHEDRINE SULFATE 50 MG/ML
5 INJECTION, SOLUTION INTRAVENOUS
Status: DISCONTINUED | OUTPATIENT
Start: 2023-04-03 | End: 2023-04-03 | Stop reason: HOSPADM

## 2023-04-03 RX ORDER — DIPHENHYDRAMINE HYDROCHLORIDE 50 MG/ML
12.5 INJECTION INTRAMUSCULAR; INTRAVENOUS EVERY 8 HOURS PRN
Status: DISCONTINUED | OUTPATIENT
Start: 2023-04-03 | End: 2023-04-03 | Stop reason: HOSPADM

## 2023-04-03 RX ORDER — ONDANSETRON 4 MG/1
4 TABLET, FILM COATED ORAL EVERY 6 HOURS PRN
Status: DISCONTINUED | OUTPATIENT
Start: 2023-04-03 | End: 2023-04-04 | Stop reason: HOSPADM

## 2023-04-03 RX ORDER — ACETAMINOPHEN 325 MG/1
650 TABLET ORAL EVERY 4 HOURS PRN
Status: DISCONTINUED | OUTPATIENT
Start: 2023-04-03 | End: 2023-04-03 | Stop reason: HOSPADM

## 2023-04-03 RX ORDER — FAMOTIDINE 10 MG/ML
20 INJECTION, SOLUTION INTRAVENOUS ONCE AS NEEDED
Status: DISCONTINUED | OUTPATIENT
Start: 2023-04-03 | End: 2023-04-03 | Stop reason: HOSPADM

## 2023-04-03 RX ORDER — OXYTOCIN/0.9 % SODIUM CHLORIDE 30/500 ML
2-20 PLASTIC BAG, INJECTION (ML) INTRAVENOUS
Status: DISCONTINUED | OUTPATIENT
Start: 2023-04-03 | End: 2023-04-03 | Stop reason: HOSPADM

## 2023-04-03 RX ORDER — OXYTOCIN/0.9 % SODIUM CHLORIDE 30/500 ML
999 PLASTIC BAG, INJECTION (ML) INTRAVENOUS ONCE
Status: COMPLETED | OUTPATIENT
Start: 2023-04-03 | End: 2023-04-03

## 2023-04-03 RX ORDER — HYDROCODONE BITARTRATE AND ACETAMINOPHEN 5; 325 MG/1; MG/1
1 TABLET ORAL EVERY 4 HOURS PRN
Status: DISCONTINUED | OUTPATIENT
Start: 2023-04-03 | End: 2023-04-04 | Stop reason: HOSPADM

## 2023-04-03 RX ORDER — MISOPROSTOL 200 UG/1
800 TABLET ORAL ONCE AS NEEDED
Status: DISCONTINUED | OUTPATIENT
Start: 2023-04-03 | End: 2023-04-03 | Stop reason: HOSPADM

## 2023-04-03 RX ORDER — ACETAMINOPHEN 325 MG/1
650 TABLET ORAL EVERY 6 HOURS PRN
Status: DISCONTINUED | OUTPATIENT
Start: 2023-04-03 | End: 2023-04-04 | Stop reason: HOSPADM

## 2023-04-03 RX ORDER — ONDANSETRON 4 MG/1
4 TABLET, FILM COATED ORAL EVERY 6 HOURS PRN
Status: DISCONTINUED | OUTPATIENT
Start: 2023-04-03 | End: 2023-04-03 | Stop reason: HOSPADM

## 2023-04-03 RX ORDER — FAMOTIDINE 10 MG/ML
20 INJECTION, SOLUTION INTRAVENOUS 2 TIMES DAILY PRN
Status: DISCONTINUED | OUTPATIENT
Start: 2023-04-03 | End: 2023-04-03 | Stop reason: HOSPADM

## 2023-04-03 RX ORDER — DOCUSATE SODIUM 100 MG/1
100 CAPSULE, LIQUID FILLED ORAL 2 TIMES DAILY
Status: DISCONTINUED | OUTPATIENT
Start: 2023-04-03 | End: 2023-04-04 | Stop reason: HOSPADM

## 2023-04-03 RX ORDER — DIPHENHYDRAMINE HCL 25 MG
25 CAPSULE ORAL NIGHTLY PRN
Status: DISCONTINUED | OUTPATIENT
Start: 2023-04-03 | End: 2023-04-04 | Stop reason: HOSPADM

## 2023-04-03 RX ORDER — SODIUM CHLORIDE, SODIUM LACTATE, POTASSIUM CHLORIDE, CALCIUM CHLORIDE 600; 310; 30; 20 MG/100ML; MG/100ML; MG/100ML; MG/100ML
125 INJECTION, SOLUTION INTRAVENOUS CONTINUOUS
Status: DISCONTINUED | OUTPATIENT
Start: 2023-04-03 | End: 2023-04-04 | Stop reason: HOSPADM

## 2023-04-03 RX ORDER — CARBOPROST TROMETHAMINE 250 UG/ML
250 INJECTION, SOLUTION INTRAMUSCULAR
Status: DISCONTINUED | OUTPATIENT
Start: 2023-04-03 | End: 2023-04-03 | Stop reason: HOSPADM

## 2023-04-03 RX ORDER — FENTANYL CITRATE 50 UG/ML
100 INJECTION, SOLUTION INTRAMUSCULAR; INTRAVENOUS
Status: DISCONTINUED | OUTPATIENT
Start: 2023-04-03 | End: 2023-04-04 | Stop reason: HOSPADM

## 2023-04-03 RX ORDER — HYDROCODONE BITARTRATE AND ACETAMINOPHEN 10; 325 MG/1; MG/1
1 TABLET ORAL EVERY 4 HOURS PRN
Status: DISCONTINUED | OUTPATIENT
Start: 2023-04-03 | End: 2023-04-04 | Stop reason: HOSPADM

## 2023-04-03 RX ORDER — ERYTHROMYCIN 5 MG/G
OINTMENT OPHTHALMIC
Status: ACTIVE
Start: 2023-04-03 | End: 2023-04-03

## 2023-04-03 RX ORDER — METHYLERGONOVINE MALEATE 0.2 MG/ML
200 INJECTION INTRAVENOUS ONCE AS NEEDED
Status: DISCONTINUED | OUTPATIENT
Start: 2023-04-03 | End: 2023-04-03 | Stop reason: HOSPADM

## 2023-04-03 RX ORDER — SODIUM CHLORIDE 0.9 % (FLUSH) 0.9 %
1-10 SYRINGE (ML) INJECTION AS NEEDED
Status: DISCONTINUED | OUTPATIENT
Start: 2023-04-03 | End: 2023-04-04 | Stop reason: HOSPADM

## 2023-04-03 RX ORDER — PRENATAL VIT/IRON FUM/FOLIC AC 27MG-0.8MG
1 TABLET ORAL DAILY
Status: DISCONTINUED | OUTPATIENT
Start: 2023-04-03 | End: 2023-04-04 | Stop reason: HOSPADM

## 2023-04-03 RX ORDER — PHYTONADIONE 1 MG/.5ML
INJECTION, EMULSION INTRAMUSCULAR; INTRAVENOUS; SUBCUTANEOUS
Status: ACTIVE
Start: 2023-04-03 | End: 2023-04-03

## 2023-04-03 RX ORDER — MAGNESIUM CARB/ALUMINUM HYDROX 105-160MG
30 TABLET,CHEWABLE ORAL ONCE AS NEEDED
Status: DISCONTINUED | OUTPATIENT
Start: 2023-04-03 | End: 2023-04-03 | Stop reason: HOSPADM

## 2023-04-03 RX ORDER — FAMOTIDINE 20 MG/1
20 TABLET, FILM COATED ORAL 2 TIMES DAILY PRN
Status: DISCONTINUED | OUTPATIENT
Start: 2023-04-03 | End: 2023-04-03 | Stop reason: HOSPADM

## 2023-04-03 RX ORDER — TERBUTALINE SULFATE 1 MG/ML
0.25 INJECTION, SOLUTION SUBCUTANEOUS AS NEEDED
Status: DISCONTINUED | OUTPATIENT
Start: 2023-04-03 | End: 2023-04-03 | Stop reason: HOSPADM

## 2023-04-03 RX ORDER — ONDANSETRON 2 MG/ML
4 INJECTION INTRAMUSCULAR; INTRAVENOUS EVERY 6 HOURS PRN
Status: DISCONTINUED | OUTPATIENT
Start: 2023-04-03 | End: 2023-04-03 | Stop reason: HOSPADM

## 2023-04-03 RX ORDER — OXYTOCIN/0.9 % SODIUM CHLORIDE 30/500 ML
125 PLASTIC BAG, INJECTION (ML) INTRAVENOUS CONTINUOUS PRN
Status: COMPLETED | OUTPATIENT
Start: 2023-04-03 | End: 2023-04-03

## 2023-04-03 RX ADMIN — LIDOCAINE HYDROCHLORIDE 30 ML: 10 INJECTION, SOLUTION EPIDURAL; INFILTRATION; INTRACAUDAL; PERINEURAL at 09:09

## 2023-04-03 RX ADMIN — SODIUM CHLORIDE, POTASSIUM CHLORIDE, SODIUM LACTATE AND CALCIUM CHLORIDE 999 ML/HR: 600; 310; 30; 20 INJECTION, SOLUTION INTRAVENOUS at 04:40

## 2023-04-03 RX ADMIN — HYDROCODONE BITARTRATE AND ACETAMINOPHEN 1 TABLET: 10; 325 TABLET ORAL at 08:56

## 2023-04-03 RX ADMIN — IBUPROFEN 600 MG: 600 TABLET, FILM COATED ORAL at 23:17

## 2023-04-03 RX ADMIN — Medication 999 ML/HR: at 07:56

## 2023-04-03 RX ADMIN — Medication 125 ML/HR: at 08:58

## 2023-04-03 RX ADMIN — SODIUM CHLORIDE, POTASSIUM CHLORIDE, SODIUM LACTATE AND CALCIUM CHLORIDE 125 ML/HR: 600; 310; 30; 20 INJECTION, SOLUTION INTRAVENOUS at 00:43

## 2023-04-03 RX ADMIN — Medication 2 MILLI-UNITS/MIN: at 02:04

## 2023-04-03 RX ADMIN — IBUPROFEN 600 MG: 600 TABLET, FILM COATED ORAL at 15:00

## 2023-04-03 RX ADMIN — FERROUS SULFATE TAB 325 MG (65 MG ELEMENTAL FE) 325 MG: 325 (65 FE) TAB at 23:17

## 2023-04-03 RX ADMIN — Medication 1 TABLET: at 14:59

## 2023-04-03 RX ADMIN — DOCUSATE SODIUM 100 MG: 100 CAPSULE, LIQUID FILLED ORAL at 15:01

## 2023-04-03 RX ADMIN — Medication 250 ML/HR: at 08:11

## 2023-04-03 RX ADMIN — FERROUS SULFATE TAB 325 MG (65 MG ELEMENTAL FE) 325 MG: 325 (65 FE) TAB at 14:58

## 2023-04-03 RX ADMIN — LIDOCAINE HYDROCHLORIDE 30 ML: 10 INJECTION, SOLUTION EPIDURAL; INFILTRATION; INTRACAUDAL; PERINEURAL at 09:08

## 2023-04-03 NOTE — OBED NOTES
[facility] Note OB        Patient Name: Es Singer  YOB: 1990  MRN: [id number]  Admission Date: 2023 11:29 PM  Date of Service: 2023    Chief Complaint: Leaking Fluid (CARMEN- Pt reports gush of fluid and continued leaking since around 2230 tonight. Pt states had lower back pain but no regular contractions. )        Subjective     Es Singer is a 33 y.o. female  at 40w2d with Estimated Date of Delivery: 3/31/23 who presents with the chief complaint listed above.  She sees Kiran Morin MD for her prenatal care. Her pregnancy has been complicated by:  B12 deficiency, Vitamin D deficiency.  She presents secondary to SROM with gush of fluid at  2230.  She describes fetal movement as normal. She denies vaginal bleeding. She is not feeling contractions but some lower back pain is present.          Objective   Patient Active Problem List    Diagnosis    • B12 deficiency [E53.8]    • Vitamin D deficiency [E55.9]    • Physical exam [Z00.00]    • Other fatigue [R53.83]         OB History    Para Term  AB Living   1 0 0 0 0 0   SAB IAB Ectopic Molar Multiple Live Births   0 0 0 0 0 0      # Outcome Date GA Lbr Khoi/2nd Weight Sex Delivery Anes PTL Lv   1 Current                 No past medical history on file.    No past surgical history on file.    No current facility-administered medications on file prior to encounter.     Current Outpatient Medications on File Prior to Encounter   Medication Sig Dispense Refill   • Prenatal Vit-Fe Fumarate-FA (Prenatal 27-1) 27-1 MG tablet tablet Take 1 tablet by mouth Daily. 90 tablet 3   • vitamin B-12 (CYANOCOBALAMIN) 1000 MCG tablet Take 1,000 mcg by mouth Daily.     • vitamin D (ERGOCALCIFEROL) 1.25 MG (20866 UT) capsule capsule Take 1 capsule by mouth 1 (One) Time Per Week. 5 capsule 5       No Known Allergies    Family History   Problem Relation Age of Onset   • Heart disease Father        Social History     Socioeconomic History   •  Marital status:    Tobacco Use   • Smoking status: Never   • Smokeless tobacco: Never   Vaping Use   • Vaping Use: Never used   Substance and Sexual Activity   • Alcohol use: Never   • Drug use: Never   • Sexual activity: Yes     Partners: Male           Review of Systems   Constitutional: Negative for chills, fatigue and fever.   HENT: Negative.    Eyes: Negative for photophobia and visual disturbance.   Respiratory: Negative for cough, chest tightness and shortness of breath.    Cardiovascular: Negative for chest pain and leg swelling.   Gastrointestinal: Negative for abdominal pain, diarrhea, nausea and vomiting.   Genitourinary: Positive for vaginal discharge ( leaking some fluid since 22:30). Negative for dysuria, flank pain, hematuria, pelvic pain and vaginal bleeding.   Musculoskeletal: Negative for back pain.   Neurological: Negative for dizziness, seizures, weakness and headaches.          PHYSICAL EXAM:      VITAL SIGNS:  Vitals:    04/02/23 2333   Weight: 83.6 kg (184 lb 6.4 oz)        FHT'S:                   Baseline:  125 BPM  Variability:  Moderate = 6 - 25 BPM  Accelerations:  15 x 15 accelerations present     Decelerations:  absent  Contractions:   present     Interpretation:   Reactive NST, CAT 1 tracing        PHYSICAL EXAM:    General: well developed; well nourished  no acute distress   Heart: Not performed.   Lungs: breathing is unlabored     Abdomen: soft, non-tender; no masses  no umbilical or inguinal hernias are present       Cervix: was examined by nurse , pooling present grossly ruptured, Nitrazine positive  Cervical Dilation (cm): 2  Cervical Effacement: 70%  Fetal Station: -2  Cervical Consistency: soft  Cervical Position: posterior   Contractions: irregular        Extremities: peripheral pulses normal, no pedal edema, no clubbing or cyanosis      LABS AND TESTING ORDERED:  1. Uterine and fetal monitoring  2. Urinalysis  3. Nitrazine    LAB RESULTS:    No results found for this or  any previous visit (from the past 24 hour(s)).    Lab Results   Component Value Date    ABO B 2022    RH Positive 2022       Lab Results   Component Value Date    STREPGPB Negative 2023                 Assessment & Plan      ASSESSMENT/PLAN:  Es Singer is a 33 y.o. female  at 40w2d who presented with possible rupture of membranes.   She was evaluated for ROM and was found to have a Pooling present and Positive Nitrazine (confirmed rupture of membranes) and her cervix was noted to be Cervical Dilation (cm): 2 cm/ Cervical Effacement: 70% % effaced/ vertex at Fetal Station: -2 station on last exam.  She was noted to have a Reactive NST.  CAT 1 tracing.  In view of these findings she will be admitted for delivery by Dr Ger Arellano MD , who will assume care and place orders at this time.      Final Impression:  • Pregnancy at 40w2d  •   • Reactive NST.  CAT 1 tracing  • Confirmed ROM  with Pooling present and Positive Nitrazine  • Contractions: irregular  Lab Results   Component Value Date    ABO B 2022    RH Positive 2022   •   Lab Results   Component Value Date    STREPGPB Negative 2023   •   • COVID - 19 status unknown        PLAN:  • Patient will be admitted to Dr. TAI Arellano MD due to confirmed rupture of membranes and Dr. TAI Arellano MD  will assume care of the patient at this time and provide further orders and management      I have spent 30 minutes including face to face time with the patient, greater than 50% in discussion of the diagnosis (counseling) and/or coordination of care.     Vishnu Lewis MD  2023  23:57 EDT  OB Hospitalist  Phone:    [phone]

## 2023-04-03 NOTE — ANESTHESIA PROCEDURE NOTES
Labor Epidural      Additional Notes  Tried at two levels wiothout success to place epidural catheter and pt c/o left leg paresthesia and voiced displeasure so nursing staff heard her at nursing station.  Could not place catheter.  No meds given and aborted attempt.  Explained situation to  and pt.

## 2023-04-03 NOTE — PLAN OF CARE
Problem: Adult Inpatient Plan of Care  Goal: Plan of Care Review  Outcome: Ongoing, Progressing  Flowsheets (Taken 4/3/2023 0739)  Progress: improving  Plan of Care Reviewed With:   patient   spouse  Outcome Evaluation: pt came in SROM , pt planned on having a epidural assisted delivery and pt ended going natural due to anesthesia being unable to get an epidural. anesthesia is coming back to reassess and to assist with pain. mom and baby is going to be continuosly monitored  Goal: Patient-Specific Goal (Individualized)  Outcome: Ongoing, Progressing  Goal: Absence of Hospital-Acquired Illness or Injury  Outcome: Ongoing, Progressing  Goal: Optimal Comfort and Wellbeing  Outcome: Ongoing, Progressing  Intervention: Provide Person-Centered Care  Recent Flowsheet Documentation  Taken 4/3/2023 0300 by Che Rodriguez, RN  Trust Relationship/Rapport:   care explained   choices provided   emotional support provided   empathic listening provided   questions answered   questions encouraged   reassurance provided   thoughts/feelings acknowledged  Goal: Readiness for Transition of Care  Outcome: Ongoing, Progressing  Intervention: Mutually Develop Transition Plan  Recent Flowsheet Documentation  Taken 4/3/2023 0053 by Che Rodriguez, RN  Equipment Currently Used at Home: none  Taken 4/3/2023 0052 by Che Rodriguez, RN  Transportation Anticipated: car, drives self  Patient/Family Anticipated Services at Transition: none  Patient/Family Anticipates Transition to: home     Problem: Bleeding (Labor)  Goal: Hemostasis  Outcome: Ongoing, Progressing     Problem: Change in Fetal Wellbeing (Labor)  Goal: Stable Fetal Wellbeing  Outcome: Ongoing, Progressing     Problem: Delayed Labor Progression (Labor)  Goal: Effective Progression to Delivery  Outcome: Ongoing, Progressing     Problem: Infection (Labor)  Goal: Absence of Infection Signs and Symptoms  Outcome: Ongoing, Progressing     Problem: Labor Pain (Labor)  Goal: Acceptable  Pain Control  Outcome: Ongoing, Progressing     Problem: Uterine Tachysystole (Labor)  Goal: Normal Uterine Contraction Pattern  Outcome: Ongoing, Progressing     Problem: Pain Acute  Goal: Acceptable Pain Control and Functional Ability  Outcome: Ongoing, Progressing     Problem: Fall Injury Risk  Goal: Absence of Fall and Fall-Related Injury  Outcome: Ongoing, Progressing   Goal Outcome Evaluation:  Plan of Care Reviewed With: patient, spouse        Progress: improving  Outcome Evaluation: pt came in SROM , pt planned on having a epidural assisted delivery and pt ended going natural due to anesthesia being unable to get an epidural. anesthesia is coming back to reassess and to assist with pain. mom and baby is going to be continuosly monitored

## 2023-04-03 NOTE — LACTATION NOTE
Lactation Consult Note  Mom called for latch assistance. Baby is sleepy and latches very shallow, suckles a few times then falls asleep. Helped Mom hand express 4ml EBM, syringe fed baby then baby started waking up and baby latched deeply, has good jaw rotations with swallows noted. Encouraged breast compressions and keeping baby awake for a good feeding. Discussed how to know baby is getting enough milk, feeding cues, expected output, nurse on demand or every 3 hrs and call for further assistance. She has Zomee breast pump here.    Evaluation Completed: 4/3/2023 14:10 EDT  Patient Name: Es Singer  :  1990  MRN:  [id number]     REFERRAL  INFORMATION:                          Date of Referral: 23   Person Making Referral: patient  Maternal Reason for Referral: breastfeeding currently  Infant Reason for Referral: disinterest in latch    DELIVERY HISTORY:        Skin to skin initiation date/time: 4/3/2023  7:55 AM   Skin to skin end date/time: 4/3/2023  9:00 AM        MATERNAL ASSESSMENT:  Breast Size Issue: none (23)  Breast Shape: Bilateral:, round (23)  Breast Density: Bilateral:, soft (23)  Areola: Bilateral:, elastic (23)  Nipples: Bilateral:, graspable (23)                INFANT ASSESSMENT:  Information for the patient's :  Lucrecia Lisa [[id number]]   No past medical history on file.     Feeding Readiness Cues: energy for feeding (23 1400)      Feeding Tolerance/Success: arousal required (23)                  Nutrition Interventions: lactation consult initiated (23 1400)            Breastfeeding: breastfeeding, left side only (23)   Infant Positioning: cross-cradle (23)         Effective Latch During Feeding: yes (23)   Suck/Swallow/Breathing Coordination: present (23 1400)   Signs of Milk Transfer: deep jaw excursions noted (23)       Latch:  2-->grasps breast, tongue down, lips flanged, rhythmic sucking (23)   Audible Swallowin-->a few with stimulation (23)   Type of Nipple: 2-->everted (after stimulation) (23)   Comfort (Breast/Nipple): 2-->soft/nontender (23)   Hold (Positioning): 0-->full assist (staff holds infant at breast) (23)   Latch Score: 7 (23)     Infant-Driven Feeding Scales - Readiness: Alert once handled. Some rooting or takes pacifier. Adequate tone. (23)   Infant-Driven Feeding Scales - Quality: Nipples with a strong coordinated SSB throughout feed. (23)            MATERNAL INFANT FEEDING:     Maternal Emotional State: relaxed (23)  Infant Positioning: clutch/football, cross-cradle (23)   Signs of Milk Transfer: deep jaw excursions noted (23)  Pain with Feeding: no (23)           Milk Ejection Reflex: present (23)           Latch Assistance: full assistance needed (23)                               EQUIPMENT TYPE:  Breast Pump Type: double electric, personal (23)  Breast Pump Flange Type: hard (23)                           BREAST PUMPING:          LACTATION REFERRALS:

## 2023-04-03 NOTE — PLAN OF CARE
Problem: Adult Inpatient Plan of Care  Goal: Plan of Care Review  Outcome: Ongoing, Progressing  Flowsheets (Taken 4/3/2023 1240)  Plan of Care Reviewed With: patient  Outcome Evaluation: Spontaneous vaginal delivery of baby girl at approx 0752.  Periurethral lidocaine given prior to delivery and during repair of third degree tear.  Vs and bleeding wnl during recovery.  Attempted to breast feed in recovery, baby was able to latch for a few minutes.  Oral pain meds given.  Pt was able to ambulate to bathroom and urinate prior to transfer to mother baby  Goal: Patient-Specific Goal (Individualized)  Outcome: Ongoing, Progressing  Goal: Absence of Hospital-Acquired Illness or Injury  Outcome: Ongoing, Progressing  Intervention: Identify and Manage Fall Risk  Recent Flowsheet Documentation  Taken 4/3/2023 0915 by Becky Carcamo RN  Safety Promotion/Fall Prevention:   activity supervised   clutter free environment maintained   fall prevention program maintained   nonskid shoes/slippers when out of bed   safety round/check completed  Intervention: Prevent and Manage VTE (Venous Thromboembolism) Risk  Recent Flowsheet Documentation  Taken 4/3/2023 0915 by Becky Carcamo RN  Activity Management:   activity adjusted per tolerance   ambulated to bathroom  Taken 4/3/2023 0815 by Becky Carcamo RN  Activity Management: activity adjusted per tolerance  Goal: Optimal Comfort and Wellbeing  Outcome: Ongoing, Progressing  Intervention: Monitor Pain and Promote Comfort  Recent Flowsheet Documentation  Taken 4/3/2023 0915 by Becky Carcamo RN  Pain Management Interventions: see MAR  Taken 4/3/2023 0845 by Becky Carcamo RN  Pain Management Interventions: see MAR  Intervention: Provide Person-Centered Care  Recent Flowsheet Documentation  Taken 4/3/2023 0915 by Becky Carcamo RN  Trust Relationship/Rapport:   care explained   choices provided   emotional support provided   empathic listening provided   questions  answered   questions encouraged   reassurance provided   thoughts/feelings acknowledged  Goal: Readiness for Transition of Care  Outcome: Ongoing, Progressing     Problem: Bleeding (Labor)  Goal: Hemostasis  Outcome: Ongoing, Progressing     Problem: Change in Fetal Wellbeing (Labor)  Goal: Stable Fetal Wellbeing  Outcome: Ongoing, Progressing     Problem: Delayed Labor Progression (Labor)  Goal: Effective Progression to Delivery  Outcome: Ongoing, Progressing     Problem: Infection (Labor)  Goal: Absence of Infection Signs and Symptoms  Outcome: Ongoing, Progressing     Problem: Labor Pain (Labor)  Goal: Acceptable Pain Control  Outcome: Ongoing, Progressing     Problem: Uterine Tachysystole (Labor)  Goal: Normal Uterine Contraction Pattern  Outcome: Ongoing, Progressing     Problem: Pain Acute  Goal: Acceptable Pain Control and Functional Ability  Outcome: Ongoing, Progressing  Intervention: Develop Pain Management Plan  Recent Flowsheet Documentation  Taken 4/3/2023 0915 by Becky Carcamo, RN  Pain Management Interventions: see MAR  Taken 4/3/2023 0845 by Becky Carcamo, RN  Pain Management Interventions: see MAR     Problem: Fall Injury Risk  Goal: Absence of Fall and Fall-Related Injury  Outcome: Ongoing, Progressing  Intervention: Promote Injury-Free Environment  Recent Flowsheet Documentation  Taken 4/3/2023 0915 by Becky Carcamo, RN  Safety Promotion/Fall Prevention:   activity supervised   clutter free environment maintained   fall prevention program maintained   nonskid shoes/slippers when out of bed   safety round/check completed     Problem: Skin Injury Risk Increased  Goal: Skin Health and Integrity  Outcome: Ongoing, Progressing   Goal Outcome Evaluation:  Plan of Care Reviewed With: patient           Outcome Evaluation: Spontaneous vaginal delivery of baby girl at approx 0752.  Periurethral lidocaine given prior to delivery and during repair of third degree tear.  Vs and bleeding wnl during  recovery.  Attempted to breast feed in recovery, baby was able to latch for a few minutes.  Oral pain meds given.  Pt was able to ambulate to bathroom and urinate prior to transfer to mother baby

## 2023-04-03 NOTE — L&D DELIVERY NOTE
[facility]   Vaginal Delivery Note    Patient Name: Es Singer  : 1990  MRN: [id number]    Date of Delivery: 4/3/2023     Diagnosis     Pre & Post-Delivery:  Intrauterine pregnancy at 40w3d  Labor status: Spontaneous Onset of Labor     Pregnancy             Problem List    Transfer to Postpartum     Review the Delivery Report for details.     Delivery     Delivery: Vaginal, Spontaneous     YOB: 2024    Time of Birth:  Gestational Age 7:52 AM   40w3d     Anesthesia: None     Delivering clinician: Ger Arellano    Forceps?   No   Vacuum? No    Shoulder dystocia present: No        Delivery narrative: Patient was complete and feeling a urge to push from about 6 AM on and did not have an epidural.  Fetal tracing was category 2 with repetitive variable decelerations.  We infiltrated with lidocaine at the perineum after she started pushing crowned.  Delivery then occurs over an intact perineum left occiput anterior.  Anterior shoulder easily clears with continued downward traction and maternal expulsive effort.  We placed the baby on maternal abdomen for 30 seconds prior to cord clamping.  Cord blood is obtained and placenta delivered about 2 minutes after delivery of the baby.  Inspection shows a spontaneous partial third-degree laceration repaired with 2-0 Vicryl Rapide and 3-0 Vicryl Rapide on the perineum.  Good uterine tone is noted following delivery.      Infant     Findings: female  infant     Infant observations: Weight: No birth weight on file.   Length:   in  Observations/Comments:  [facility] room 6      Apgars: 8  @ 1 minute /    9  @ 5 minutes   Infant Name: .     Placenta & Cord         Placenta delivered  Spontaneous  at   4/3/2023  7:56 AM     Cord: 3 vessels  present.   Nuchal Cord?  no   Cord blood obtained: Yes    Cord gases obtained:  No    Cord gas results: Venous:  No results found for: PHCVEN    Arterial:  No results found for: PHCART     Repair      Episiotomy: Not  recorded     No    Lacerations: Yes  Laceration Information  Laceration Repaired?   Perineal:    Third-degree   Periurethral:       Labial:       Sulcus:       Vaginal:       Cervical:         Suture used for repair: 2-0 Vicryl Rapide and 3-0 Vicryl Rapide   Estimated Blood Loss:  300 cc     Quantitative Blood Loss:  Pending, see nurse entry        Complications     none    Disposition     Mother to Mother Baby/Postpartum  in stable condition currently.  Baby to remains with mom  in stable condition currently.    Ger Arellano MD  04/03/23  08:16 EDT

## 2023-04-03 NOTE — H&P
Addended by: PERRY CROWLEY on: 1/4/2022 02:22 PM     Modules accepted: Orders     [facility]  Obstetric History and Physical    Chief Complaint   Patient presents with   • Leaking Fluid     CARMEN- Pt reports gush of fluid and continued leaking since around 2230 tonight. Pt states had lower back pain but no regular contractions.        Subjective     Patient is a 33 y.o. female  currently at 40w3d, who presents with active labor.  She also had spontaneous rupture membranes at 2230 on 2023.    Her prenatal care is benign.  Her previous obstetric/gynecological history is noted for is non-contributory.    The following portions of the patients history were reviewed and updated as appropriate: current medications, allergies, past medical history, past surgical history, past family history, past social history and problem list .       Prenatal Information:  Prenatal Results     POC Urine Glucose/Protein     Test Value Reference Range Date Time    Urine Glucose ^ Negative  Negative 23     Urine Protein ^ Negative  Negative 23           Initial Prenatal Labs     Test Value Reference Range Date Time    Hemoglobin  12.5 g/dL 11.1 - 15.9 22 1458       13.0 g/dL 11.1 - 15.9 22 0925    Hematocrit  37.6 % 34.0 - 46.6 22 1458       39.1 % 34.0 - 46.6 22 0925    Platelets  234 x10E3/uL 150 - 450 22 1458       230 x10E3/uL 150 - 450 22 0925    Rubella IgG  3.59 index Immune >0.99 22 1458    Hepatitis B SAg  Negative  Negative 22 1458    Hepatitis C Ab  <0.1 s/co ratio 0.0 - 0.9 22 1458    RPR  Non Reactive  Non Reactive 22 1458    T. Pallidum Ab         ABO  B   23 0044    Rh  Positive   23 0044    Antibody Screen  Negative  Negative 22 1458    HIV  Non Reactive  Non Reactive 22 1458    Urine Culture  Final report   22 1552    Gonorrhea        Chlamydia        TSH  3.170 uIU/mL 0.450 - 4.500 22 0925    HgB A1c               2nd and 3rd Trimester     Test Value Reference Range Date Time     Hemoglobin (repeated)  12.5 g/dL 12.0 - 15.9 04/03/23 0044       11.4 g/dL 12.0 - 15.9 01/10/23 1431       10.8 g/dL 12.0 - 15.9 12/13/22 1152    Hematocrit (repeated)  37.8 % 34.0 - 46.6 04/03/23 0044       33.9 % 34.0 - 46.6 01/10/23 1431       32.6 % 34.0 - 46.6 12/13/22 1152    Platelets   180 10*3/mm3 140 - 450 04/03/23 0044       181 10*3/mm3 140 - 450 12/13/22 1152       234 x10E3/uL 150 - 450 09/13/22 1458       230 x10E3/uL 150 - 450 08/01/22 0925    GCT  55 mg/dL 65 - 139 12/13/22 1152    Antibody Screen (repeated)  Negative   04/03/23 0044    GTT Fasting        GTT 1 Hr        GTT 2 Hr        GTT 3 Hr        Group B Strep  Negative  Negative 03/08/23           Drug Screening     Test Value Reference Range Date Time    Amphetamine Screen        Barbiturate Screen        Benzodiazepine Screen        Methadone Screen        Phencyclidine Screen        Opiates Screen        THC Screen        Cocaine Screen        Propoxyphene Screen        Buprenorphine Screen        Methamphetamine Screen        Oxycodone Screen        Tricyclic Antidepressants Screen              Other (Risk screening)     Test Value Reference Range Date Time    Varicella IgG        Parvovirus IgG        CMV IgG        Cystic Fibrosis        Hemoglobin electrophoresis        NIPT        MSAFP-4        AFP (for NTD only)  *Screen Negative*   10/11/22 1526          Legend    ^: Historical                      External Prenatal Results     Pregnancy Outside Results - Transcribed From Office Records - See Scanned Records For Details     Test Value Date Time    ABO  B  04/03/23 0044    Rh  Positive  04/03/23 0044    Antibody Screen  Negative  04/03/23 0044       Negative  09/13/22 1458    Varicella IgG       Rubella  3.59 index 09/13/22 1458    Hgb  12.5 g/dL 04/03/23 0044       11.4 g/dL 01/10/23 1431       10.8 g/dL 12/13/22 1152       12.5 g/dL 09/13/22 1458       13.0 g/dL 08/01/22 0925    Hct  37.8 % 04/03/23 0044       33.9 % 01/10/23 1431        32.6 % 22 1152       37.6 % 22 1458       39.1 % 22 0925    Glucose Fasting GTT       Glucose Tolerance Test 1 hour       Glucose Tolerance Test 3 hour       Gonorrhea (discrete)       Chlamydia (discrete)       RPR  Non Reactive  22 1458    VDRL       Syphilis Antibody       HBsAg  Negative  22 1458    Herpes Simplex Virus PCR       Herpes Simplex VIrus Culture       HIV  Non Reactive  22 1458    Hep C RNA Quant PCR       Hep C Antibody  <0.1 s/co ratio 22 1458    AFP  38.2 ng/mL 10/11/22 1526    Group B Strep  Negative  23     GBS Susceptibility to Clindamycin       GBS Susceptibility to Erythromycin       Fetal Fibronectin       Genetic Testing, Maternal Blood             Drug Screening     Test Value Date Time    Urine Drug Screen       Amphetamine Screen       Barbiturate Screen       Benzodiazepine Screen       Methadone Screen       Phencyclidine Screen       Opiates Screen       THC Screen       Cocaine Screen       Propoxyphene Screen       Buprenorphine Screen       Methamphetamine Screen       Oxycodone Screen       Tricyclic Antidepressants Screen             Legend    ^: Historical                         Past OB History:     OB History    Para Term  AB Living   1 0 0 0 0 0   SAB IAB Ectopic Molar Multiple Live Births   0 0 0 0 0 0      # Outcome Date GA Lbr Khoi/2nd Weight Sex Delivery Anes PTL Lv   1 Current                Past Medical History: History reviewed. No pertinent past medical history.   Past Surgical History History reviewed. No pertinent surgical history.   Family History: Family History   Problem Relation Age of Onset   • Heart disease Father       Social History:  reports that she has never smoked. She has never used smokeless tobacco.   reports no history of alcohol use.   reports no history of drug use.        General ROS: Pertinent items are noted in HPI, all other systems reviewed and negative    Objective        Vital Signs Range for the last 24 hours  Temperature: Temp:  [98.1 °F (36.7 °C)] 98.1 °F (36.7 °C)   Temp Source: Temp src: Oral   BP: BP: (108-125)/(65-80) 124/75   Pulse: Heart Rate:  [] 90   Respirations:     SPO2: SpO2:  [100 %] 100 %   O2 Amount (l/min):     O2 Devices     Weight: Weight:  [83.6 kg (184 lb 6.4 oz)] 83.6 kg (184 lb 6.4 oz)     Physical Examination: General appearance - alert, well appearing, and in no distress and oriented to person, place, and time  Mental status - alert, oriented to person, place, and time, normal mood, behavior, speech, dress, motor activity, and thought processes  Chest - clear to auscultation, no wheezes, rales or rhonchi, symmetric air entry  Heart - normal rate, regular rhythm, normal S1, S2, no murmurs, rubs, clicks or gallops  Abdomen - soft, nontender, nondistended, no masses or organomegaly  Gravid with fundal height consistent with 40 weeks gestation  Neurological - alert, oriented, normal speech, no focal findings or movement disorder noted    Presentation:  Cephalic   Cervix: Exam by: Method: sterile exam per RN   Dilation: Cervical Dilation (cm): 10   Effacement: Cervical Effacement: 100%   Station: Fetal Station: 1-->-2       Fetal Heart Rate Assessment   Method: Fetal HR Assessment Method: external   Beats/min: Fetal HR (beats/min): 125   Baseline: Fetal HR Baseline: normal range   Variability: Fetal HR Variability: moderate (amplitude range 6 to 25 bpm)   Accels: Fetal HR Accelerations: greater than/equal to 15 bpm, lasting at least 15 seconds   Decels: Fetal HR Decelerations: absent   Tracing Category:       Uterine Assessment   Method: Method: external tocotransducer   Frequency (min): Contraction Frequency (Minutes): 2-5   Ctx Count in 10 min: Contractions in 10 Minutes: maxime   Duration:     Intensity: Contraction Intensity: moderate by palpation   Intensity by IUPC:     Resting Tone: Uterine Resting Tone: soft by palpation   Resting Tone by IUPC:      [place] Units:       Laboratory Results: Hemoglobin 12.5  Radiology Review: Fetal growth was at 61st percentile on 3/23/2023.  Other Studies: .    Assessment & Plan       Pregnancy        Assessment:  1.  Intrauterine pregnancy at 40w3d gestation with reactive, reassuring fetal status.    2.  labor  with ROM  3.  Obstetrical history significant for is non-contributory.  4.  GBS status:   Strep Gp B Culture   Date Value Ref Range Status   2023 Negative Negative Final     Comment:     Centers for Disease Control and Prevention (CDC) and American Congress  of Obstetricians and Gynecologists (ACOG) guidelines for prevention of   group B streptococcal (GBS) disease specify co-collection of  a vaginal and rectal swab specimen to maximize sensitivity of GBS  detection. Per the CDC and ACOG, swabbing both the lower vagina and  rectum substantially increases the yield of detection compared with  sampling the vagina alone.  Penicillin G, ampicillin, or cefazolin are indicated for intrapartum  prophylaxis of  GBS colonization. Reflex susceptibility  testing should be performed prior to use of clindamycin only on GBS  isolates from penicillin-allergic women who are considered a high risk  for anaphylaxis. Treatment with vancomycin without additional testing  is warranted if resistance to clindamycin is noted.         Plan:  1. Vaginal anticipated, fetal and uterine monitoring  continuously, labor augmentation  Pitocin and analgesia with  epidural  2. Plan of care has been reviewed with patient and she agrees  3.  Risks, benefits of treatment plan have been discussed.  4.  All questions have been answered.  5.  .      Ger Arellano MD  4/3/2023  07:13 EDT

## 2023-04-03 NOTE — ANESTHESIA PREPROCEDURE EVALUATION
Anesthesia Evaluation                  Airway   Dental      Pulmonary    Cardiovascular         Neuro/Psych  GI/Hepatic/Renal/Endo      Musculoskeletal     Abdominal    Substance History      OB/GYN      Comment: 40 weeks 3 d      Other                        Anesthesia Plan    CODE STATUS:    Level Of Support Discussed With: Patient  Code Status (Patient has no pulse and is not breathing): CPR (Attempt to Resuscitate)  Medical Interventions (Patient has pulse or is breathing): Full Support  Release to patient: Routine Release

## 2023-04-03 NOTE — LACTATION NOTE
"This note was copied from a baby's chart.  P1 term baby, 4hrs old, nursed \"some\" after delivery. Mom is eating lunch now. Encouraged to call for any assistance. Discussed how to know baby is getting enough milk, feeding cues, expected output. She has personal pump at home.  "

## 2023-04-04 VITALS
OXYGEN SATURATION: 100 % | RESPIRATION RATE: 16 BRPM | WEIGHT: 184.4 LBS | HEART RATE: 80 BPM | DIASTOLIC BLOOD PRESSURE: 69 MMHG | BODY MASS INDEX: 29.63 KG/M2 | HEIGHT: 66 IN | SYSTOLIC BLOOD PRESSURE: 106 MMHG | TEMPERATURE: 97.9 F

## 2023-04-04 LAB
BASOPHILS # BLD AUTO: 0.04 10*3/MM3 (ref 0–0.2)
BASOPHILS NFR BLD AUTO: 0.3 % (ref 0–1.5)
DEPRECATED RDW RBC AUTO: 39.8 FL (ref 37–54)
EOSINOPHIL # BLD AUTO: 0.05 10*3/MM3 (ref 0–0.4)
EOSINOPHIL NFR BLD AUTO: 0.3 % (ref 0.3–6.2)
ERYTHROCYTE [DISTWIDTH] IN BLOOD BY AUTOMATED COUNT: 11.8 % (ref 12.3–15.4)
HCT VFR BLD AUTO: 33.2 % (ref 34–46.6)
HGB BLD-MCNC: 11.6 G/DL (ref 12–15.9)
IMM GRANULOCYTES # BLD AUTO: 0.12 10*3/MM3 (ref 0–0.05)
IMM GRANULOCYTES NFR BLD AUTO: 0.8 % (ref 0–0.5)
LYMPHOCYTES # BLD AUTO: 1.99 10*3/MM3 (ref 0.7–3.1)
LYMPHOCYTES NFR BLD AUTO: 13.8 % (ref 19.6–45.3)
MCH RBC QN AUTO: 31.5 PG (ref 26.6–33)
MCHC RBC AUTO-ENTMCNC: 34.9 G/DL (ref 31.5–35.7)
MCV RBC AUTO: 90.2 FL (ref 79–97)
MONOCYTES # BLD AUTO: 0.77 10*3/MM3 (ref 0.1–0.9)
MONOCYTES NFR BLD AUTO: 5.3 % (ref 5–12)
NEUTROPHILS NFR BLD AUTO: 11.43 10*3/MM3 (ref 1.7–7)
NEUTROPHILS NFR BLD AUTO: 79.5 % (ref 42.7–76)
NRBC BLD AUTO-RTO: 0 /100 WBC (ref 0–0.2)
PLATELET # BLD AUTO: 182 10*3/MM3 (ref 140–450)
PMV BLD AUTO: 11.4 FL (ref 6–12)
RBC # BLD AUTO: 3.68 10*6/MM3 (ref 3.77–5.28)
WBC NRBC COR # BLD: 14.4 10*3/MM3 (ref 3.4–10.8)

## 2023-04-04 PROCEDURE — 85025 COMPLETE CBC W/AUTO DIFF WBC: CPT | Performed by: OBSTETRICS & GYNECOLOGY

## 2023-04-04 RX ORDER — IBUPROFEN 600 MG/1
600 TABLET ORAL EVERY 6 HOURS PRN
Qty: 25 TABLET | Refills: 1 | Status: SHIPPED | OUTPATIENT
Start: 2023-04-04

## 2023-04-04 RX ADMIN — DOCUSATE SODIUM 100 MG: 100 CAPSULE, LIQUID FILLED ORAL at 07:51

## 2023-04-04 RX ADMIN — Medication 1 TABLET: at 07:51

## 2023-04-04 NOTE — LACTATION NOTE
Lactation Consult Note  Mother called for help with BF. Reported baby has been very sleepy and no consistent latch achieved so far. Assisted mother in waking up the baby and in latching her in a football position to the right breast. Educated her starting nose to nipple to obtain deep latch and baby was able to achieve it after few attempts and some suck training. Baby has recessed chin and pressing her gums really hard. After some suck training the latch become better. Once on mom's breast she is latching well, has nutritive suckle, and has a good jaw rotation, but mom said is slightly pinching. Adjusted baby's latch with chin tug and mom reports that latch feels better. Discussed ways to keep baby awake during BF. Encouraged mom to try to BF every 2-3 hours for 15 min. on each side. Educated on importance of deep latching, hand expression, how to know if baby is getting enough, different ways to rouse infant and burping her. Mom is able easily to express colostrum. She declines any questions and concerns at this time. Encouraged to call LC if needing further assistance.          Evaluation Completed: 4/3/2023 23:09 EDT  Patient Name: Es Singer  :  1990  MRN:  [id number]     REFERRAL  INFORMATION:                          Date of Referral: 23   Person Making Referral: patient  Maternal Reason for Referral: breastfeeding currently  Infant Reason for Referral: sleepy    DELIVERY HISTORY:        Skin to skin initiation date/time: 4/3/2023  7:55 AM   Skin to skin end date/time: 4/3/2023  9:00 AM        MATERNAL ASSESSMENT:  Breast Size Issue: none (23)  Breast Shape: Bilateral:, round (23)  Breast Density: Bilateral:, soft (23)  Areola: Bilateral:, elastic (23)  Nipples: Bilateral:, everted, graspable (23)                INFANT ASSESSMENT:  Information for the patient's :  Lisa Singer [[id number]]   No past medical history on  file.     Feeding Readiness Cues: sleeping (23)      Feeding Tolerance/Success: arousal required, sleepy (23)               Feeding Interventions: arousal required, latch assistance provided, lips stroked, sucking promoted (23)               Breastfeeding: breastfeeding, right side only (23)   Infant Positioning: clutch/football (23)         Effective Latch During Feeding: yes (23)   Suck/Swallow/Breathing Coordination: present (23)   Signs of Milk Transfer: deep jaw excursions noted (23)       Latch: 2-->grasps breast, tongue down, lips flanged, rhythmic sucking (23)   Audible Swallowin-->a few with stimulation (23)   Type of Nipple: 2-->everted (after stimulation) (23)   Comfort (Breast/Nipple): 2-->soft/nontender (23)   Hold (Positioning): 1-->minimal assist, teach one side, mother does other, staff holds (23)   Latch Score: 8 (23)                    MATERNAL INFANT FEEDING:     Maternal Emotional State: receptive (23)  Infant Positioning: clutch/football (23)   Signs of Milk Transfer: deep jaw excursions noted (23)  Pain with Feeding: yes (23)  Pain Location: nipple, right (23)  Pain Description: squeezing (23)  Comfort Measures Before/During Feeding: latch adjusted, infant position adjusted, maternal position adjusted (23)  Milk Ejection Reflex: present (23)           Latch Assistance: minimal assistance (23)                               EQUIPMENT TYPE:                                 BREAST PUMPING:          LACTATION REFERRALS:

## 2023-04-04 NOTE — LACTATION NOTE
This note was copied from a baby's chart.  Breast feeding going well per Mom. Baby has had 2 stools today since midnight. Discussed her needing 2 wet diapers today. Mom will call for any assistance or questions.

## 2023-04-04 NOTE — DISCHARGE SUMMARY
Date of Discharge:  4/4/2023    Discharge Diagnosis: 40 weeks 3 days gestation status post onset of labor and spontaneous vaginal delivery    Presenting Problem/History of Present Illness  Pregnancy [Z34.90]       Hospital Course  Patient is a 33 y.o. female G1, P0 at 40 weeks 3 days gestation admitted with spontaneous rupture of fluid and onset of labor.  Patient eventually underwent a spontaneous vaginal delivery of a live viable female infant weighing 8 pounds 2.5 ounces with Apgars of 8 and 9.  There was a partial third-degree midline episiotomy repair.  Both mother and infant are doing well in the first postpartum day and are very much desiring discharge home today.  Her blood type is B+ rubella status is immune and she is breast-feeding.  Her hemoglobin is stable at 11.6..      Procedures Performed         Consults:   Consults     Date and Time Order Name Status Description    4/3/2023 12:10 AM Inpatient Anesthesiology Consult            Pertinent Test Results: As above    Condition on Discharge: Stable    Vital Signs  Temp:  [97.3 °F (36.3 °C)-98.7 °F (37.1 °C)] 97.9 °F (36.6 °C)  Heart Rate:  [76-92] 80  Resp:  [16] 16  BP: (103-119)/(60-76) 106/69    Physical Exam:   Vital signs stable.  Afebrile.  Lochia scant.  Sutures healing well.    Discharge Disposition    Home  Discharge Medications     Discharge Medications      ASK your doctor about these medications      Instructions Start Date   ferrous sulfate 325 (65 FE) MG tablet   325 mg, Oral, 2 Times Daily With Meals      Prenatal 27-1 27-1 MG tablet tablet   1 tablet, Oral, Daily      vitamin B-12 1000 MCG tablet  Commonly known as: CYANOCOBALAMIN   1,000 mcg, Oral, Daily      vitamin D 1.25 MG (67178 UT) capsule capsule  Commonly known as: ERGOCALCIFEROL   50,000 Units, Oral, Weekly             Discharge Diet:   Regular  Activity at Discharge:   As tolerated  Follow-up Appointments  Future Appointments   Date Time Provider Department Center   4/7/2023   9:00 AM  [facility]   4/7/2023  9:30 AM Kiran Morin MD [facility]     6 weeks with Dr. Johnson    Test Results Pending at Discharge       Yogesh Lopez MD  04/04/23  09:33 EDT    Time: 09 35.

## 2023-05-16 ENCOUNTER — POSTPARTUM VISIT (OUTPATIENT)
Dept: OBSTETRICS AND GYNECOLOGY | Facility: CLINIC | Age: 33
End: 2023-05-16
Payer: COMMERCIAL

## 2023-05-16 VITALS
SYSTOLIC BLOOD PRESSURE: 110 MMHG | BODY MASS INDEX: 27.03 KG/M2 | HEIGHT: 66 IN | WEIGHT: 168.2 LBS | DIASTOLIC BLOOD PRESSURE: 64 MMHG

## 2023-05-16 PROCEDURE — 0503F POSTPARTUM CARE VISIT: CPT | Performed by: OBSTETRICS & GYNECOLOGY

## 2023-05-16 NOTE — PROGRESS NOTES
AVIS Singer  is a 33 y.o. female who presents for 6-week postpartum checkup.  Overall, she is feeling well.  Bowels and bladder are functioning normally.  Lochia has resolved.  Pain from delivery has resolved.  The baby is doing well.  The baby is breast-feeding.    Chief Complaint   Patient presents with   • Postpartum Care     Patient is here for a 6 week postpartum.       History reviewed. No pertinent past medical history.    History reviewed. No pertinent surgical history.    Social History     Socioeconomic History   • Marital status:    Tobacco Use   • Smoking status: Never   • Smokeless tobacco: Never   Vaping Use   • Vaping Use: Never used   Substance and Sexual Activity   • Alcohol use: Never   • Drug use: Never   • Sexual activity: Yes     Partners: Male       The following portions of the patient's history were reviewed and updated as appropriate: allergies, current medications, past family history, past medical history, past social history, past surgical history and problem list.    Review of Systems   Constitutional: Negative.    HENT: Negative.    Respiratory: Negative.    Cardiovascular: Negative.    Gastrointestinal: Negative.    Genitourinary: Negative.    Musculoskeletal: Negative.    Skin: Negative.    Allergic/Immunologic: Negative.    Psychiatric/Behavioral: Negative.              Physical Exam  Vitals and nursing note reviewed.   Constitutional:       Appearance: She is well-developed.   HENT:      Head: Normocephalic and atraumatic.   Cardiovascular:      Rate and Rhythm: Normal rate and regular rhythm.   Pulmonary:      Effort: Pulmonary effort is normal.      Breath sounds: Normal breath sounds. No wheezing or rales.   Chest:      Comments: The breasts are homogeneous.  There are no palpable lumps.  Nipple discharge and axillary adenopathy are absent.  Abdominal:      General: There is no distension.      Palpations: Abdomen is soft.      Tenderness: There is no abdominal  tenderness.   Genitourinary:     Labia:         Right: No lesion.         Left: No lesion.       Vagina: Normal. No vaginal discharge.      Cervix: No cervical motion tenderness.      Uterus: Normal. Not enlarged and not tender.       Adnexa:         Right: No mass or tenderness.          Left: No mass or tenderness.     Skin:     General: Skin is warm and dry.   Neurological:      Mental Status: She is alert and oriented to person, place, and time.         Assessment    Diagnoses and all orders for this visit:    1. 6 weeks postpartum follow-up (Primary)  -     IGP, Rfx Aptima HPV ASCU        Plan  1. Appropriate progress, 6 weeks postpartum.  Okay to resume all normal activities of daily living  2. Pap performed today.  3. Contraceptive counseling.  The patient would like to resume using condoms.    Return in about 1 year (around 5/16/2024).    Social History     Tobacco Use   Smoking Status Never   Smokeless Tobacco Never

## 2023-05-22 LAB
CONV .: NORMAL
CYTOLOGIST CVX/VAG CYTO: NORMAL
CYTOLOGY CVX/VAG DOC CYTO: NORMAL
CYTOLOGY CVX/VAG DOC THIN PREP: NORMAL
DX ICD CODE: NORMAL
HIV 1 & 2 AB SER-IMP: NORMAL
OTHER STN SPEC: NORMAL
STAT OF ADQ CVX/VAG CYTO-IMP: NORMAL

## 2023-06-02 DIAGNOSIS — Z00.00 PHYSICAL EXAM: ICD-10-CM

## 2023-06-02 RX ORDER — PRENATAL WITH FERROUS FUM AND FOLIC ACID 3080; 920; 120; 400; 22; 1.84; 3; 20; 10; 1; 12; 200; 27; 25; 2 [IU]/1; [IU]/1; MG/1; [IU]/1; MG/1; MG/1; MG/1; MG/1; MG/1; MG/1; UG/1; MG/1; MG/1; MG/1; MG/1
1 TABLET ORAL DAILY
Qty: 90 TABLET | Refills: 3 | OUTPATIENT
Start: 2023-06-02

## 2023-06-05 ENCOUNTER — TELEPHONE (OUTPATIENT)
Dept: OBSTETRICS AND GYNECOLOGY | Facility: CLINIC | Age: 33
End: 2023-06-05
Payer: COMMERCIAL

## 2023-06-05 DIAGNOSIS — Z00.00 PHYSICAL EXAM: ICD-10-CM

## 2023-06-05 RX ORDER — PRENATAL WITH FERROUS FUM AND FOLIC ACID 3080; 920; 120; 400; 22; 1.84; 3; 20; 10; 1; 12; 200; 27; 25; 2 [IU]/1; [IU]/1; MG/1; [IU]/1; MG/1; MG/1; MG/1; MG/1; MG/1; MG/1; UG/1; MG/1; MG/1; MG/1; MG/1
1 TABLET ORAL DAILY
Qty: 90 TABLET | Refills: 3 | Status: SHIPPED | OUTPATIENT
Start: 2023-06-05

## 2023-06-05 NOTE — TELEPHONE ENCOUNTER
Prescription has been refilled with additional refills for a year, if needed.  Please advise the patient to continue taking prenatal vitamins as long as she is breast-feeding her baby.  Thank you

## 2023-06-05 NOTE — TELEPHONE ENCOUNTER
Patient 8 weeks postpartum. Wanted to know if should keep taking the prenatal vitamins. If so needs refill sent to [facility] in system please. Thank You.